# Patient Record
Sex: FEMALE | Race: WHITE | NOT HISPANIC OR LATINO | Employment: UNEMPLOYED | URBAN - METROPOLITAN AREA
[De-identification: names, ages, dates, MRNs, and addresses within clinical notes are randomized per-mention and may not be internally consistent; named-entity substitution may affect disease eponyms.]

---

## 2023-10-26 ENCOUNTER — TELEPHONE (OUTPATIENT)
Dept: OBSTETRICS AND GYNECOLOGY | Facility: CLINIC | Age: 68
End: 2023-10-26
Payer: MEDICARE

## 2023-10-26 DIAGNOSIS — Z79.890 HORMONE REPLACEMENT THERAPY: ICD-10-CM

## 2023-10-26 RX ORDER — ESTRADIOL 0.5 MG/1
0.5 TABLET ORAL DAILY
Qty: 90 TABLET | Refills: 0 | Status: SHIPPED | OUTPATIENT
Start: 2023-10-26 | End: 2024-03-19 | Stop reason: SDUPTHER

## 2023-10-26 RX ORDER — MEDROXYPROGESTERONE ACETATE 2.5 MG/1
2.5 TABLET ORAL DAILY
Qty: 90 TABLET | Refills: 0 | Status: SHIPPED | OUTPATIENT
Start: 2023-10-26 | End: 2024-02-05 | Stop reason: SDUPTHER

## 2023-10-26 RX ORDER — MEDROXYPROGESTERONE ACETATE 2.5 MG/1
1 TABLET ORAL DAILY
COMMUNITY
Start: 2021-04-27 | End: 2023-10-26 | Stop reason: SDUPTHER

## 2023-10-26 RX ORDER — ESTRADIOL 0.5 MG/1
1 TABLET ORAL DAILY
COMMUNITY
Start: 2021-04-27 | End: 2023-10-26 | Stop reason: SDUPTHER

## 2023-10-26 NOTE — TELEPHONE ENCOUNTER
[birth year]                        PU    Last RA 2022 (Medicare pt)    Patient has no upcoming appointments scheduled.    Received fax from [facility] (mail order) req refill on     Medroxypr AC  Tab  2.5 mg  #90            and      Estradiol -- but this request was on a separate request with NO DOSAGE  Or STRENGTH Listed

## 2024-02-05 ENCOUNTER — TELEPHONE (OUTPATIENT)
Dept: OBSTETRICS AND GYNECOLOGY | Facility: CLINIC | Age: 69
End: 2024-02-05
Payer: MEDICARE

## 2024-02-05 DIAGNOSIS — Z79.890 HORMONE REPLACEMENT THERAPY: ICD-10-CM

## 2024-02-05 RX ORDER — MEDROXYPROGESTERONE ACETATE 2.5 MG/1
2.5 TABLET ORAL DAILY
Qty: 90 TABLET | Refills: 0 | Status: SHIPPED | OUTPATIENT
Start: 2024-02-05 | End: 2024-03-19 | Stop reason: CLARIF

## 2024-02-05 NOTE — TELEPHONE ENCOUNTER
PT CALLED AND LEFT VM ON RX LINE IN BB TO GET A RX FOR MEDROXYPROGESTERONE 2.5MG SENT TO [facility]. PU PT AND SHE HAS HER ANNUAL ON 2-15-24/MP

## 2024-02-13 NOTE — PROGRESS NOTES
Subjective   Patient ID: Margot Pineda is a 68 y.o. female who presents for No chief complaint on file..    Pap: 12/12/2017 NEG HPV NEG  Mammo: 5/20/2021 NEG  Dexa: 5/20/2021   Colonoscopy: 4/24/2015, 10 years.     No VB. Had to change HRT because of insurance. Was off for a while and had hot flashes. Patient needed a refill on Estradiol tablet and the patch was ordered by mistake. DIdn't like the patch. Had a bad reaction. Still has some hot flashes. Taking 1/2 of Estradiol tablet. Tolerating hot flashes.     Up to date with cholesterol, etc.     Takes vit D and calcium. Mother may have had osteoporosis.     degenerative disc disease in neck. Has steroids, Baclofen. Doing exercises. Just had an MRI. Degenerative disc disease.   with colon cancer. Mets to LNs.       Review of Systems    Objective       Assessment/Plan

## 2024-02-15 ENCOUNTER — APPOINTMENT (OUTPATIENT)
Dept: OBSTETRICS AND GYNECOLOGY | Facility: CLINIC | Age: 69
End: 2024-02-15
Payer: MEDICARE

## 2024-03-18 NOTE — PROGRESS NOTES
"Subjective   Patient ID: Margot Pineda \"Apurva" is a 68 y.o. female who presents for hormone replacement.    Pap: 7/25/22 NEG NEG; 12/12/2017 NEG HPV NEG  Mammo: 8/12/22 NEG  Dexa: 5/20/2021   Colonoscopy: 4/24/2015, 10 years.    Thinks she has had a prolapse for years, sometimes has to push back in. Urgency/frequency. Gets some bowel leakage.     No VB. Had to change HRT because of insurance. Was off for a while and had hot flashes.  DIdn't like the patch. Had a bad reaction. Still has some hot flashes. Taking 1/2 of Estradiol tablet. Tolerating hot flashes.     Up to date with cholesterol, etc.     Takes vit D and calcium. Mother may have had osteoporosis.     degenerative disc disease in neck. Has steroids, Baclofen. Doing exercises. Just had an MRI. Degenerative disc disease.    * passed 4 months ago. Retired. Step daughter is in the area. She has a lot to do at the house.      Review of Systems   Eyes:  Positive for visual disturbance.   Genitourinary:  Positive for frequency and urgency.   Musculoskeletal:  Positive for back pain.   Neurological:  Positive for numbness.   Psychiatric/Behavioral:  Positive for sleep disturbance.         Anxiety  Depression  Hot flashes.   All other systems reviewed and are negative.      Objective   Constitutional: Alert and in no acute distress. Well developed, well nourished.  Neck: no neck asymmetry. Supple and thyroid not enlarged and there were no palpable thyroid nodules.  Chest: Breasts normal appearance, no nipple discharge and no skin changes and palpation of breasts and axillae: no palpable mass and no axillary lymphadenopathy.  Abdomen: soft nontender; no abdominal mass palpated, no organomegaly and no hernias.  Genitourinary: external genitalia: normal, no inguinal lymphadenopathy, Bartholin's urethral and [place]'s glands: normal, urethra: normal and bladder: normal on palpation.  Vagina: normal. Prolapse not appreciated today.  Cervix: normal.  Uterus: " normal.   Right adnexa/parametria: normal.  Left adnexa/parametria: normal.  Skin: normal skin color and pigmentation, normal skin turgor and no rash.  Psychiatric: alert and oriented x 3, affect normal to patient baseline and mood appropriate.     Assessment/Plan   -[facility]  -DEXA  -Will refill HRT for now, consider coming off next year. Try Progesterone 100 mg. Discussed risks with increasing age but as patient just lost her  would not change now. She is taking 1/2 tab of Estradiol.  -Refer to Dr. Arango for prolapse.

## 2024-03-19 ENCOUNTER — OFFICE VISIT (OUTPATIENT)
Dept: OBSTETRICS AND GYNECOLOGY | Facility: CLINIC | Age: 69
End: 2024-03-19
Payer: MEDICARE

## 2024-03-19 VITALS — BODY MASS INDEX: 19.84 KG/M2 | WEIGHT: 122 LBS | DIASTOLIC BLOOD PRESSURE: 82 MMHG | SYSTOLIC BLOOD PRESSURE: 140 MMHG

## 2024-03-19 DIAGNOSIS — Z13.820 SCREENING FOR OSTEOPOROSIS: ICD-10-CM

## 2024-03-19 DIAGNOSIS — Z79.890 HORMONE REPLACEMENT THERAPY: ICD-10-CM

## 2024-03-19 DIAGNOSIS — Z78.0 ASYMPTOMATIC POSTMENOPAUSAL STATE: ICD-10-CM

## 2024-03-19 DIAGNOSIS — Z12.31 ENCOUNTER FOR SCREENING MAMMOGRAM FOR BREAST CANCER: Primary | ICD-10-CM

## 2024-03-19 PROCEDURE — 1159F MED LIST DOCD IN RCRD: CPT | Performed by: OBSTETRICS & GYNECOLOGY

## 2024-03-19 PROCEDURE — 99213 OFFICE O/P EST LOW 20 MIN: CPT | Performed by: OBSTETRICS & GYNECOLOGY

## 2024-03-19 PROCEDURE — 1160F RVW MEDS BY RX/DR IN RCRD: CPT | Performed by: OBSTETRICS & GYNECOLOGY

## 2024-03-19 PROCEDURE — 1036F TOBACCO NON-USER: CPT | Performed by: OBSTETRICS & GYNECOLOGY

## 2024-03-19 RX ORDER — ESTRADIOL 0.5 MG/1
0.5 TABLET ORAL DAILY
Qty: 90 TABLET | Refills: 2 | Status: SHIPPED | OUTPATIENT
Start: 2024-03-19

## 2024-03-19 RX ORDER — PROGESTERONE 100 MG/1
100 CAPSULE ORAL NIGHTLY
Qty: 90 CAPSULE | Refills: 3 | Status: SHIPPED | OUTPATIENT
Start: 2024-03-19 | End: 2025-03-19

## 2024-03-19 ASSESSMENT — ENCOUNTER SYMPTOMS
BACK PAIN: 1
SLEEP DISTURBANCE: 1
NUMBNESS: 1
FREQUENCY: 1

## 2024-03-27 ENCOUNTER — HOSPITAL ENCOUNTER (OUTPATIENT)
Dept: RADIOLOGY | Facility: HOSPITAL | Age: 69
Discharge: HOME | End: 2024-03-27
Payer: MEDICARE

## 2024-03-27 VITALS — WEIGHT: 122 LBS | BODY MASS INDEX: 19.61 KG/M2 | HEIGHT: 66 IN

## 2024-03-27 DIAGNOSIS — Z12.31 ENCOUNTER FOR SCREENING MAMMOGRAM FOR BREAST CANCER: ICD-10-CM

## 2024-03-27 DIAGNOSIS — Z78.0 ASYMPTOMATIC POSTMENOPAUSAL STATE: ICD-10-CM

## 2024-03-27 DIAGNOSIS — Z13.820 SCREENING FOR OSTEOPOROSIS: ICD-10-CM

## 2024-03-27 PROCEDURE — 77080 DXA BONE DENSITY AXIAL: CPT

## 2024-03-27 PROCEDURE — 77067 SCR MAMMO BI INCL CAD: CPT | Performed by: RADIOLOGY

## 2024-03-27 PROCEDURE — 77067 SCR MAMMO BI INCL CAD: CPT

## 2024-03-27 PROCEDURE — 77063 BREAST TOMOSYNTHESIS BI: CPT | Performed by: RADIOLOGY

## 2024-03-27 PROCEDURE — 77080 DXA BONE DENSITY AXIAL: CPT | Performed by: STUDENT IN AN ORGANIZED HEALTH CARE EDUCATION/TRAINING PROGRAM

## 2024-04-16 ENCOUNTER — APPOINTMENT (OUTPATIENT)
Dept: OBSTETRICS AND GYNECOLOGY | Facility: CLINIC | Age: 69
End: 2024-04-16
Payer: MEDICARE

## 2024-10-20 ASSESSMENT — PROMIS GLOBAL HEALTH SCALE
RATE_PHYSICAL_HEALTH: VERY GOOD
EMOTIONAL_PROBLEMS: OFTEN
RATE_GENERAL_HEALTH: GOOD
RATE_QUALITY_OF_LIFE: VERY GOOD
CARRYOUT_SOCIAL_ACTIVITIES: GOOD
RATE_AVERAGE_PAIN: 4
RATE_MENTAL_HEALTH: GOOD
RATE_AVERAGE_FATIGUE: MILD
RATE_SOCIAL_SATISFACTION: FAIR
CARRYOUT_PHYSICAL_ACTIVITIES: COMPLETELY

## 2024-10-23 ENCOUNTER — APPOINTMENT (OUTPATIENT)
Dept: PRIMARY CARE | Facility: CLINIC | Age: 69
End: 2024-10-23
Payer: MEDICARE

## 2024-10-23 VITALS
OXYGEN SATURATION: 95 % | WEIGHT: 126 LBS | SYSTOLIC BLOOD PRESSURE: 119 MMHG | DIASTOLIC BLOOD PRESSURE: 84 MMHG | TEMPERATURE: 98.3 F | BODY MASS INDEX: 20.25 KG/M2 | HEIGHT: 66 IN | HEART RATE: 81 BPM

## 2024-10-23 DIAGNOSIS — K62.3 RECTAL PROLAPSE: ICD-10-CM

## 2024-10-23 DIAGNOSIS — Z00.00 ROUTINE GENERAL MEDICAL EXAMINATION AT HEALTH CARE FACILITY: Primary | ICD-10-CM

## 2024-10-23 DIAGNOSIS — E87.1 HYPONATREMIA: ICD-10-CM

## 2024-10-23 DIAGNOSIS — F41.9 ANXIETY: ICD-10-CM

## 2024-10-23 DIAGNOSIS — Z79.890 HORMONE REPLACEMENT THERAPY: ICD-10-CM

## 2024-10-23 DIAGNOSIS — R20.2 PARESTHESIA: ICD-10-CM

## 2024-10-23 DIAGNOSIS — Z13.220 LIPID SCREENING: ICD-10-CM

## 2024-10-23 DIAGNOSIS — E78.00 ELEVATED CHOLESTEROL: ICD-10-CM

## 2024-10-23 PROCEDURE — 1124F ACP DISCUSS-NO DSCNMKR DOCD: CPT | Performed by: FAMILY MEDICINE

## 2024-10-23 PROCEDURE — 1159F MED LIST DOCD IN RCRD: CPT | Performed by: FAMILY MEDICINE

## 2024-10-23 PROCEDURE — 1036F TOBACCO NON-USER: CPT | Performed by: FAMILY MEDICINE

## 2024-10-23 PROCEDURE — 1160F RVW MEDS BY RX/DR IN RCRD: CPT | Performed by: FAMILY MEDICINE

## 2024-10-23 PROCEDURE — 99213 OFFICE O/P EST LOW 20 MIN: CPT | Performed by: FAMILY MEDICINE

## 2024-10-23 PROCEDURE — 1170F FXNL STATUS ASSESSED: CPT | Performed by: FAMILY MEDICINE

## 2024-10-23 PROCEDURE — 3008F BODY MASS INDEX DOCD: CPT | Performed by: FAMILY MEDICINE

## 2024-10-23 PROCEDURE — G0439 PPPS, SUBSEQ VISIT: HCPCS | Performed by: FAMILY MEDICINE

## 2024-10-23 RX ORDER — BUSPIRONE HYDROCHLORIDE 7.5 MG/1
7.5 TABLET ORAL 2 TIMES DAILY PRN
Qty: 60 TABLET | Refills: 2 | Status: SHIPPED | OUTPATIENT
Start: 2024-10-23 | End: 2025-10-23

## 2024-10-23 ASSESSMENT — ACTIVITIES OF DAILY LIVING (ADL)
DRESSING: INDEPENDENT
TAKING_MEDICATION: INDEPENDENT
GROCERY_SHOPPING: INDEPENDENT
DOING_HOUSEWORK: INDEPENDENT
BATHING: INDEPENDENT
MANAGING_FINANCES: INDEPENDENT

## 2024-10-23 ASSESSMENT — PATIENT HEALTH QUESTIONNAIRE - PHQ9
10. IF YOU CHECKED OFF ANY PROBLEMS, HOW DIFFICULT HAVE THESE PROBLEMS MADE IT FOR YOU TO DO YOUR WORK, TAKE CARE OF THINGS AT HOME, OR GET ALONG WITH OTHER PEOPLE: NOT DIFFICULT AT ALL
SUM OF ALL RESPONSES TO PHQ9 QUESTIONS 1 AND 2: 0
1. LITTLE INTEREST OR PLEASURE IN DOING THINGS: SEVERAL DAYS
SUM OF ALL RESPONSES TO PHQ9 QUESTIONS 1 AND 2: 2
2. FEELING DOWN, DEPRESSED OR HOPELESS: SEVERAL DAYS
2. FEELING DOWN, DEPRESSED OR HOPELESS: NOT AT ALL
1. LITTLE INTEREST OR PLEASURE IN DOING THINGS: NOT AT ALL

## 2024-10-23 ASSESSMENT — ENCOUNTER SYMPTOMS
SHORTNESS OF BREATH: 0
CONSTIPATION: 0
FATIGUE: 0

## 2024-10-23 NOTE — PATIENT INSTRUCTIONS
Get your blood work as ordered.  You should hear from our office with results whether they are normal are not within a few days.  Please call the office if you do not hear from us.     You should be getting cardiovascular exercise 3-5 times per week for 30-45 minutes.  This includes exercises such as running, brisk walking, biking or swimming.         Referral to colorectal surgery     Anxiety medication as needed    Suspect some SIADH will check blood work and urine    Recommend dermatology evaluation  [facility] in [place]  [phone] or   [facility], [place] ,  several offices  [phone]

## 2024-10-23 NOTE — ASSESSMENT & PLAN NOTE
Orders:    CBC and Auto Differential; Future    Comprehensive Metabolic Panel; Future    Thyroid Stimulating Hormone; Future    Lipid Panel; Future    Vitamin B12; Future    Osmolality; Future    Sodium, urine, random; Future    Osmolality, urine; Future

## 2024-10-23 NOTE — PROGRESS NOTES
"Subjective   Reason for Visit: Margot Pineda is an 69 y.o. female here for a Medicare Wellness visit.     Past Medical, Surgical, and Family History reviewed and updated in chart.    Reviewed all medications by prescribing practitioner or clinical pharmacist (such as prescriptions, OTCs, herbal therapies and supplements) and documented in the medical record.        Walks routinely   Exercising       Stool urgency at times     DJD in neck   Occ numbness  in hands   Mostly at night   Not bad during day       Occasionally gets a little upset related to anxiety thinking about her 's passing it has been a year        Patient Care Team:  Annabelle Ovalle MD as PCP - General (Family Medicine)  Maya Quiros [facility]     Review of Systems   Constitutional:  Negative for fatigue.   Respiratory:  Negative for shortness of breath.    Cardiovascular:  Negative for chest pain.   Gastrointestinal:  Negative for constipation.       Objective   Vitals:  /84   Pulse 81   Temp 36.8 °C (98.3 °F)   Ht 1.676 m (5' 6\")   Wt 57.2 kg (126 lb)   SpO2 95%   BMI 20.34 kg/m²       Physical Exam  Constitutional:       General: She is not in acute distress.     Appearance: Normal appearance.   HENT:      Head: Normocephalic and atraumatic.      Right Ear: Tympanic membrane, ear canal and external ear normal.      Left Ear: Tympanic membrane, ear canal and external ear normal.      Nose: Nose normal.      Mouth/Throat:      Mouth: Mucous membranes are moist.      Pharynx: No oropharyngeal exudate or posterior oropharyngeal erythema.   Eyes:      Extraocular Movements: Extraocular movements intact.      Conjunctiva/sclera: Conjunctivae normal.      Pupils: Pupils are equal, round, and reactive to light.   Cardiovascular:      Rate and Rhythm: Normal rate and regular rhythm.      Heart sounds: No murmur heard.  Pulmonary:      Effort: Pulmonary effort is normal.      Breath sounds: Normal " breath sounds.   Abdominal:      General: Bowel sounds are normal.      Palpations: Abdomen is soft.   Musculoskeletal:         General: Normal range of motion.      Cervical back: No rigidity.   Lymphadenopathy:      Cervical: No cervical adenopathy.   Skin:     General: Skin is warm and dry.      Findings: No rash.   Neurological:      General: No focal deficit present.      Mental Status: She is alert and oriented to person, place, and time.      Cranial Nerves: No cranial nerve deficit.      Gait: Gait normal.   Psychiatric:         Mood and Affect: Mood normal.         Behavior: Behavior normal.         Assessment & Plan  Hormone replacement therapy    Orders:    CBC and Auto Differential; Future    Comprehensive Metabolic Panel; Future    Thyroid Stimulating Hormone; Future    Lipid Panel; Future    Vitamin B12; Future    Osmolality; Future    Sodium, urine, random; Future    Osmolality, urine; Future    Routine general medical examination at health care facility    Orders:    1 Year Follow Up In Primary Care - Wellness Exam; Future    CBC and Auto Differential; Future    Comprehensive Metabolic Panel; Future    Thyroid Stimulating Hormone; Future    Lipid Panel; Future    Vitamin B12; Future    Osmolality; Future    Sodium, urine, random; Future    Osmolality, urine; Future    Rectal prolapse    Orders:    Referral to Colorectal Surgery; Future    CBC and Auto Differential; Future    Comprehensive Metabolic Panel; Future    Thyroid Stimulating Hormone; Future    Lipid Panel; Future    Vitamin B12; Future    Osmolality; Future    Sodium, urine, random; Future    Osmolality, urine; Future    Lipid screening    Orders:    CBC and Auto Differential; Future    Comprehensive Metabolic Panel; Future    Thyroid Stimulating Hormone; Future    Lipid Panel; Future    Vitamin B12; Future    Osmolality; Future    Sodium, urine, random; Future    Osmolality, urine; Future    Paresthesia    Orders:    CBC and Auto  Differential; Future    Comprehensive Metabolic Panel; Future    Thyroid Stimulating Hormone; Future    Lipid Panel; Future    Vitamin B12; Future    Osmolality; Future    Sodium, urine, random; Future    Osmolality, urine; Future    Elevated cholesterol    Orders:    CBC and Auto Differential; Future    Comprehensive Metabolic Panel; Future    Thyroid Stimulating Hormone; Future    Lipid Panel; Future    Vitamin B12; Future    Osmolality; Future    Sodium, urine, random; Future    Osmolality, urine; Future    Hyponatremia    Orders:    CBC and Auto Differential; Future    Comprehensive Metabolic Panel; Future    Thyroid Stimulating Hormone; Future    Lipid Panel; Future    Vitamin B12; Future    Osmolality; Future    Sodium, urine, random; Future    Osmolality, urine; Future    Anxiety    Orders:    busPIRone (Buspar) 7.5 mg tablet; Take 1 tablet (7.5 mg) by mouth 2 times a day as needed (anxiety).

## 2024-10-23 NOTE — ASSESSMENT & PLAN NOTE
Orders:    1 Year Follow Up In Primary Care - Wellness Exam; Future    CBC and Auto Differential; Future    Comprehensive Metabolic Panel; Future    Thyroid Stimulating Hormone; Future    Lipid Panel; Future    Vitamin B12; Future    Osmolality; Future    Sodium, urine, random; Future    Osmolality, urine; Future

## 2024-10-24 ENCOUNTER — LAB (OUTPATIENT)
Dept: LAB | Facility: LAB | Age: 69
End: 2024-10-24
Payer: MEDICARE

## 2024-10-24 DIAGNOSIS — E87.1 HYPONATREMIA: ICD-10-CM

## 2024-10-24 DIAGNOSIS — Z79.890 HORMONE REPLACEMENT THERAPY: ICD-10-CM

## 2024-10-24 DIAGNOSIS — E78.00 ELEVATED CHOLESTEROL: ICD-10-CM

## 2024-10-24 DIAGNOSIS — R20.2 PARESTHESIA: ICD-10-CM

## 2024-10-24 DIAGNOSIS — Z00.00 ROUTINE GENERAL MEDICAL EXAMINATION AT HEALTH CARE FACILITY: ICD-10-CM

## 2024-10-24 DIAGNOSIS — K62.3 RECTAL PROLAPSE: ICD-10-CM

## 2024-10-24 DIAGNOSIS — Z13.220 LIPID SCREENING: ICD-10-CM

## 2024-10-24 LAB
ALBUMIN SERPL BCP-MCNC: 4.4 G/DL (ref 3.4–5)
ALP SERPL-CCNC: 74 U/L (ref 33–136)
ALT SERPL W P-5'-P-CCNC: 23 U/L (ref 7–45)
ANION GAP SERPL CALC-SCNC: 13 MMOL/L (ref 10–20)
AST SERPL W P-5'-P-CCNC: 30 U/L (ref 9–39)
BASOPHILS # BLD AUTO: 0.04 X10*3/UL (ref 0–0.1)
BASOPHILS NFR BLD AUTO: 0.9 %
BILIRUB SERPL-MCNC: 0.6 MG/DL (ref 0–1.2)
BUN SERPL-MCNC: 15 MG/DL (ref 6–23)
CALCIUM SERPL-MCNC: 9.9 MG/DL (ref 8.6–10.3)
CHLORIDE SERPL-SCNC: 95 MMOL/L (ref 98–107)
CHOLEST SERPL-MCNC: 209 MG/DL (ref 0–199)
CHOLESTEROL/HDL RATIO: 2.6
CO2 SERPL-SCNC: 27 MMOL/L (ref 21–32)
CREAT SERPL-MCNC: 0.79 MG/DL (ref 0.5–1.05)
EGFRCR SERPLBLD CKD-EPI 2021: 82 ML/MIN/1.73M*2
EOSINOPHIL # BLD AUTO: 0.06 X10*3/UL (ref 0–0.7)
EOSINOPHIL NFR BLD AUTO: 1.4 %
ERYTHROCYTE [DISTWIDTH] IN BLOOD BY AUTOMATED COUNT: 13.1 % (ref 11.5–14.5)
GLUCOSE SERPL-MCNC: 96 MG/DL (ref 74–99)
HCT VFR BLD AUTO: 38.4 % (ref 36–46)
HDLC SERPL-MCNC: 80 MG/DL
HGB BLD-MCNC: 13.4 G/DL (ref 12–16)
IMM GRANULOCYTES # BLD AUTO: 0.02 X10*3/UL (ref 0–0.7)
IMM GRANULOCYTES NFR BLD AUTO: 0.5 % (ref 0–0.9)
LDLC SERPL CALC-MCNC: 120 MG/DL
LYMPHOCYTES # BLD AUTO: 1.29 X10*3/UL (ref 1.2–4.8)
LYMPHOCYTES NFR BLD AUTO: 29.3 %
MCH RBC QN AUTO: 31.1 PG (ref 26–34)
MCHC RBC AUTO-ENTMCNC: 34.9 G/DL (ref 32–36)
MCV RBC AUTO: 89 FL (ref 80–100)
MONOCYTES # BLD AUTO: 0.55 X10*3/UL (ref 0.1–1)
MONOCYTES NFR BLD AUTO: 12.5 %
NEUTROPHILS # BLD AUTO: 2.45 X10*3/UL (ref 1.2–7.7)
NEUTROPHILS NFR BLD AUTO: 55.4 %
NON HDL CHOLESTEROL: 129 MG/DL (ref 0–149)
NRBC BLD-RTO: 0 /100 WBCS (ref 0–0)
PLATELET # BLD AUTO: 357 X10*3/UL (ref 150–450)
POTASSIUM SERPL-SCNC: 4.1 MMOL/L (ref 3.5–5.3)
PROT SERPL-MCNC: 6.7 G/DL (ref 6.4–8.2)
RBC # BLD AUTO: 4.31 X10*6/UL (ref 4–5.2)
SODIUM SERPL-SCNC: 131 MMOL/L (ref 136–145)
TRIGL SERPL-MCNC: 46 MG/DL (ref 0–149)
TSH SERPL-ACNC: 1.94 MIU/L (ref 0.44–3.98)
VLDL: 9 MG/DL (ref 0–40)
WBC # BLD AUTO: 4.4 X10*3/UL (ref 4.4–11.3)

## 2024-10-24 PROCEDURE — 85025 COMPLETE CBC W/AUTO DIFF WBC: CPT

## 2024-10-24 PROCEDURE — 80061 LIPID PANEL: CPT

## 2024-10-24 PROCEDURE — 83935 ASSAY OF URINE OSMOLALITY: CPT

## 2024-10-24 PROCEDURE — 83930 ASSAY OF BLOOD OSMOLALITY: CPT

## 2024-10-24 PROCEDURE — 82570 ASSAY OF URINE CREATININE: CPT

## 2024-10-24 PROCEDURE — 84443 ASSAY THYROID STIM HORMONE: CPT

## 2024-10-24 PROCEDURE — 80053 COMPREHEN METABOLIC PANEL: CPT

## 2024-10-24 PROCEDURE — 82607 VITAMIN B-12: CPT

## 2024-10-24 PROCEDURE — 84300 ASSAY OF URINE SODIUM: CPT

## 2024-10-24 PROCEDURE — 36415 COLL VENOUS BLD VENIPUNCTURE: CPT

## 2024-10-25 LAB
CREAT UR-MCNC: 23.8 MG/DL (ref 20–320)
OSMOLALITY SERPL: 278 MOSM/KG (ref 280–300)
OSMOLALITY UR: 218 MOSM/KG (ref 200–1200)
SODIUM UR-SCNC: 34 MMOL/L
SODIUM/CREAT UR-RTO: 143 MMOL/G CREAT
VIT B12 SERPL-MCNC: 997 PG/ML (ref 211–911)

## 2024-10-27 NOTE — RESULT ENCOUNTER NOTE
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

## 2024-11-01 ENCOUNTER — OFFICE VISIT (OUTPATIENT)
Dept: SURGERY | Facility: CLINIC | Age: 69
End: 2024-11-01
Payer: MEDICARE

## 2024-11-01 VITALS
HEIGHT: 67 IN | WEIGHT: 126 LBS | BODY MASS INDEX: 19.78 KG/M2 | DIASTOLIC BLOOD PRESSURE: 88 MMHG | HEART RATE: 75 BPM | SYSTOLIC BLOOD PRESSURE: 136 MMHG | OXYGEN SATURATION: 98 %

## 2024-11-01 DIAGNOSIS — K62.3 RECTAL PROLAPSE: ICD-10-CM

## 2024-11-01 DIAGNOSIS — K62.3 RECTAL PROLAPSE: Primary | ICD-10-CM

## 2024-11-01 PROCEDURE — 3008F BODY MASS INDEX DOCD: CPT | Performed by: SURGERY

## 2024-11-01 PROCEDURE — 99214 OFFICE O/P EST MOD 30 MIN: CPT | Performed by: SURGERY

## 2024-11-11 ENCOUNTER — APPOINTMENT (OUTPATIENT)
Dept: PHYSICAL THERAPY | Facility: CLINIC | Age: 69
End: 2024-11-11
Payer: MEDICARE

## 2024-11-19 ENCOUNTER — APPOINTMENT (OUTPATIENT)
Dept: PHYSICAL THERAPY | Facility: CLINIC | Age: 69
End: 2024-11-19
Payer: MEDICARE

## 2024-11-21 ENCOUNTER — EVALUATION (OUTPATIENT)
Dept: PHYSICAL THERAPY | Facility: CLINIC | Age: 69
End: 2024-11-21
Payer: MEDICARE

## 2024-11-21 DIAGNOSIS — K62.3 RECTAL PROLAPSE: ICD-10-CM

## 2024-11-21 PROCEDURE — 97112 NEUROMUSCULAR REEDUCATION: CPT | Mod: GP | Performed by: PHYSICAL THERAPIST

## 2024-11-21 PROCEDURE — 97530 THERAPEUTIC ACTIVITIES: CPT | Mod: GP | Performed by: PHYSICAL THERAPIST

## 2024-11-21 PROCEDURE — 97162 PT EVAL MOD COMPLEX 30 MIN: CPT | Mod: GP | Performed by: PHYSICAL THERAPIST

## 2024-11-21 NOTE — PROGRESS NOTES
"           Physical Therapy Pelvic Floor Evaluation    Patient Name: Margot Pineda \"Estela\"  MRN: [id number]  Evaluation Date: 2024  Time Calculation  Start Time: 1605  Stop Time: 1700  Time Calculation (min): 55 min  PT Evaluation Time Entry  PT Evaluation (Moderate) Time Entry: 30     PT Therapeutic Procedures Time Entry  Neuromuscular Re-Education Time Entry: 15  Therapeutic Activity Time Entry: 10       Problem List Items Addressed This Visit             ICD-10-CM    Rectal prolapse K62.3        Subjective    Precautions:     Rectal prolapse    Pain:     No pain     PELVIC HISTORY:  Chief Complaint/Description of Symptoms:   Long time problem, just put off.  Didn't tell  or any doctors till now.  Patient reports her rectum \"falls out\" and she has to manually push int back in.  Also reports fecal incontinence.  Usually can feel the incontinence after it happens.  Has a history of eating disorder even from adolescence.  Waiting for consult with   Past Medical History:    Arthritis,   Home Environment/Social Factors/Occupation:   Retired,  passed away last year and is still dealing with grief  Patient Primary Goal:   To get stronger to assist in limiting prolapse    PELVIC PAIN:     No pain or pressure    MENSTRUAL HISTORY:  Post menopausal     BLADDER:     Daytime Voiding Frequency: at least 16 times  Nighttime Voiding Frequency: 4 times a night  Unintentional urine loss:  NO    BOWEL:     BM Frequency: a few times a day  Frequent constipation/straining/incomplete emptying: feels incomplete  [place] Stool Scale ratin,2   FI occurs with what activity: walking, on feet lifting,   can tell just before going to have incontinence;    FLUID/DIET INTAKE:  Water:  16.9 ounces -- 8 bottles (was told by dr that may be drinking too much water as sodium levels are too low)   Diet:  fruits, veggies, whole grains, yogurt  Yogurt with berries, banana, mutligrain sourdough, cereal, salad with veggies and " cheese and protein;      EXERCISE:  Current exercise regime:   Walking, sit ups, yoga;      Objective   POSTURE/ALIGNMENT:  Slender, scoliosis, + stork       ROM:  Lumbar ROM Range   Flexion full   Extension 75%      MMT:  Hip MMT L R   Hip Flexion 4-/5 4-/5   Hip Extension 4/5 4/5   Hip Abduction 4/5 4/5      TA: good TA activation, poor active straight leg raise, poor diaphragmatic breathing unless cued.      FLEXIBILITY R/L:  Hamstrings: tight/tight  Piriformis:  tight/tight    PELVIC FLOOR  Deferred till next session;      Outcome Measures:  NIH-CPSI: 18  Pain: 0  Urinary: 8  Quality of Life Impact: 10     Treatments:  Therapeutic activity:      Discussed avoiding sit ups  Bowel elimination positions,   urge suppression techniques  Neuro Re-education:    Breathing pattern;      OP EDUCATION:  Outpatient Education  Individual(s) Educated: Patient  Education Provided: Anatomy, Physiology, POC, Home Exercise Program  Risk and Benefits Discussed with Patient/Caregiver/Other: yes  Patient/Caregiver Demonstrated Understanding: yes  Plan of Care Discussed and Agreed Upon: yes  Patient Response to Education: Patient/Caregiver Verbalized Understanding of Information, Patient/Caregiver Performed Return Demonstration of Exercises/Activities, Patient/Caregiver Asked Appropriate Questions    Assessment     PT Assessment Results: Decreased strength, Decreased range of motion, Decreased mobility, Decreased coordination, Pain  Rehab Prognosis: Good  Evaluation/Treatment Tolerance: Patient tolerated treatment well    Pt is a 69 y.o. female who presents with impairments of weakness, impaired breathing coordination. These impairments have led to functional limitations including fecal incontinence and urinary urgency. Pt would benefit from skilled physical therapy intervention to improve above impairments and facilitate return to function.     Complexity of Evaluation: Moderate    Based on the history including personal factors  and/or comorbidities, examination of body systems including body structures and function, activity limitations, and/or participation restrictions, as well as clinical presentation, patient meets criteria for a Moderate complexity evaluation.    Plan:  Treatment/Interventions: Biofeedback, Dry needling, Education/ Instruction, Electrical stimulation, Manual therapy, Neuromuscular re-education, Therapeutic activities, Therapeutic exercises  PT Plan: Skilled PT  PT Frequency: 1 time per week  Duration: 10 sessions  Certification Period Start Date: 11/21/24  Certification Period End Date: 02/19/25  Number of Treatments Authorized: medical necessity  Rehab Potential: Good  Plan of Care Agreement: Patient    Insurance Plan: Payor: MEDICARE / Plan: MEDICARE PART A AND B / Product Type: *No Product type* /     Next session:  pelvic floor assessment    Goals:     Pelvic Floor     STGs  1)  Patient will be knowledgeable with regards to downtraining techniques to improve relaxation of pelvic floor muscles  2)  Patient will report 25% fecal incontinence during walking  3)  Patient will perform diaphragmatic breathing properly to relax and contract pelvic floor muscle appropriately  4)  Patient will demonstrate good transverse abdominis activation to stabilize lumbopelvic girdle during ADLs  5)  Patient will report successful use of urge suppression strategies at least 50% of the time      LTGs;    1)  Patient will report improved sleep with less waking to void (will only wake 1-2 times a night)   2)  Patient will report reduce the amount of times patient urinates during the day to 8  3)  Patient will report 50% less fecal incontinence   4)  Patient will improve pelvic floor contraction to by 1/2-1 MMT  with coordinated exhale for improved continence

## 2024-11-25 ENCOUNTER — TREATMENT (OUTPATIENT)
Dept: PHYSICAL THERAPY | Facility: CLINIC | Age: 69
End: 2024-11-25
Payer: MEDICARE

## 2024-11-25 DIAGNOSIS — K62.3 RECTAL PROLAPSE: ICD-10-CM

## 2024-11-25 PROCEDURE — 97110 THERAPEUTIC EXERCISES: CPT | Mod: GP | Performed by: PHYSICAL THERAPIST

## 2024-11-25 PROCEDURE — 97112 NEUROMUSCULAR REEDUCATION: CPT | Mod: GP | Performed by: PHYSICAL THERAPIST

## 2024-11-25 NOTE — PROGRESS NOTES
Physical Therapy Treatment    Patient Name: Margot Pineda  MRN: [id number]  Encounter date:  11/25/2024  Time Calculation  Start Time: 0900  Stop Time: 1000  Time Calculation (min): 60 min     PT Therapeutic Procedures Time Entry  Neuromuscular Re-Education Time Entry: 30  Therapeutic Exercise Time Entry: 25    Visit Number:  2 (including evaluation)  Planned total visits: 10 per POC  Visits Authorized/Insurance Coverage:  11/20/2024: [place] A/[place], [place], [place], [place], ($0 USED PT/ST)     Current Problem  Problem List Items Addressed This Visit             ICD-10-CM    Rectal prolapse K62.3     Precautions     No precautions    Pain     No pain     Subjective  General        Leans forward in standing to assist in reducing prolapse when unable to manually reduce.      Objective  + visible sign of prolapse -- EAS not closed.    0/5 EAS and IAS strength initially but able to achieve 1/5 with cues    Treatment:  Neuro Re-education:  With verbal consent to rectal examination and treatment  Rectal digital biofeedback for posterior pelvic floor contraction  Cues need for exhaling and contraction.    Also performed sidelying hip adduction with pillow with kegel and exhale.  (Increased muscle activation.  Therapeutic Exercise:    Bridging x 10   Hip adduction with ball/pillow x 10   Hip abduction in hooklying  with blue band x 10   Clamshell in sidelying:   10   Heel raises x 10   Discussed exhaling on effort and with transfers     Current HEP:  Access Code: [email or web address]  URL: [email or web address]  Date: 11/25/2024  Prepared by: Fidelina Arreola    Exercises  - Supine Bridge with Pelvic Floor Contraction and Hip Rotation  - 1 x daily - 7 x weekly - 1 sets - 10 reps  - Pelvic Floor Contractions in Hooklying with Adduction  - 1 x daily - 7 x weekly - 1 sets - 10 reps  - Pelvic Floor Contractions in Hooklying with Resisted Abduction  - 1 x daily - 7 x weekly - 1 sets - 10 reps  - Sidelying Clamshell with Pelvic Floor  Contraction  - 1 x daily - 7 x weekly - 1 sets - 10 reps  - Standing Heel Raise with Support  - 1 x daily - 7 x weekly - 1 sets - 10 reps  - Sit to Stand with Pelvic Floor Contraction  - 1 x daily - 7 x weekly - 1 sets - 1-2 reps    OP EDUCATION:     Updated HEP  Assessment:     Pt's response to treatment:  Patient with weakness of EAS and IAS.  Non tender to palpation of pelvic floor.  Patient seeing Colorectal surgeon in January.  Will continue to benefit from PT strengthen sphincters and pelvic floor to improve continence and reduce prolapse episodes.      Pain end of session: 0    Plan:     Continue with current POC with the following changes Biofeedback and stim as needed    Assessment of current progress against goals:  Insufficient treatment time to assess progress    Goals:     Pelvic Floor     STGs - 5 sessions  1)  Patient will be knowledgeable with regards to downtraining techniques to improve relaxation of pelvic floor muscles  2)  Patient will report 25% fecal incontinence during walking  3)  Patient will perform diaphragmatic breathing properly to relax and contract pelvic floor muscle appropriately  4)  Patient will demonstrate good transverse abdominis activation to stabilize lumbopelvic girdle during ADLs  5)  Patient will report successful use of urge suppression strategies at least 50% of the time      LTGs - 10 sessions  1)  Patient will report improved sleep with less waking to void (will only wake 1-2 times a night)   2)  Patient will report reduce the amount of times patient urinates during the day to 8  3)  Patient will report 50% less fecal incontinence   4)  Patient will improve pelvic floor contraction to by 1/2-1 MMT  with coordinated exhale for improved continence

## 2024-12-06 ENCOUNTER — TREATMENT (OUTPATIENT)
Dept: PHYSICAL THERAPY | Facility: CLINIC | Age: 69
End: 2024-12-06
Payer: MEDICARE

## 2024-12-06 DIAGNOSIS — K62.3 RECTAL PROLAPSE: ICD-10-CM

## 2024-12-06 PROCEDURE — 97112 NEUROMUSCULAR REEDUCATION: CPT | Mod: GP | Performed by: PHYSICAL THERAPIST

## 2024-12-06 PROCEDURE — 97110 THERAPEUTIC EXERCISES: CPT | Mod: GP | Performed by: PHYSICAL THERAPIST

## 2024-12-06 NOTE — PROGRESS NOTES
Physical Therapy Treatment    Patient Name: Margot Pineda  MRN: [id number]  Encounter date:  12/6/2024  Time Calculation  Start Time: 1110  Stop Time: 1210  Time Calculation (min): 60 min     PT Therapeutic Procedures Time Entry  Neuromuscular Re-Education Time Entry: 25  Therapeutic Exercise Time Entry: 30    Visit Number:  3 (including evaluation)  Planned total visits: 10 per POC  Visits Authorized/Insurance Coverage:  11/20/2024: [place] A/[place], [place], [place], ($0 USED PT/ST)     Current Problem  Problem List Items Addressed This Visit             ICD-10-CM    Rectal prolapse K62.3       Precautions     No precautions    Pain     No pain     Subjective  General        Not getting up as much to urinate.  Still with fecal incontinence and prolapse.      Objective  + visible sign of prolapse -- EAS not closed.    Resting tone 3 uV... contraction to 7-10 uV    Treatment:  Neuro Re-education:  With verbal consent to rectal examination and treatment  Used biofeedback with rectal sensor -- patient in left sidelying  Kegel, kegel with 10 sec hold, and kegel with hip adduction  Had one episode of pushing sensor out instead of zachary to draw inward.    Therapeutic Exercise:    Bridging x 10   Bridging with hip adduction with ball squeeze x 10   Hip adduction with ball/pillow x 10   Hip abduction in hooklying  with blue band x 10   Clamshell in sidelying:   10   Heel raises x 10   Side stepping with green band around ankles x 10' x 4  Sitting hip IR x 10 with green band and ball squeeze  Discussed exhaling on effort and with transfers     Current HEP:  Access Code: [email or web address]  URL: [email or web address]  Date: 11/25/2024  Prepared by: Fidelina Arreola    Exercises  - Supine Bridge with Pelvic Floor Contraction and Hip Rotation  - 1 x daily - 7 x weekly - 1 sets - 10 reps  - Pelvic Floor Contractions in Hooklying with Adduction  - 1 x daily - 7 x weekly - 1 sets - 10 reps  - Pelvic Floor Contractions in  Hooklying with Resisted Abduction  - 1 x daily - 7 x weekly - 1 sets - 10 reps  - Sidelying Clamshell with Pelvic Floor Contraction  - 1 x daily - 7 x weekly - 1 sets - 10 reps  - Standing Heel Raise with Support  - 1 x daily - 7 x weekly - 1 sets - 10 reps  - Sit to Stand with Pelvic Floor Contraction  - 1 x daily - 7 x weekly - 1 sets - 1-2 reps    OP EDUCATION:     Updated HEP  Assessment:     Pt's response to treatment:  Patient able to contract and visualize pelvic floor contraction during biofeedback.  Patient with updated HEP to improve pelvic floor contraction.  Continues with weakness and mismanagement of pressure.    Pain end of session: 0    Plan:     Continue with current POC with the following changes Biofeedback and stim as needed    Assessment of current progress against goals:  Insufficient treatment time to assess progress    Goals:     Pelvic Floor     STGs - 5 sessions  1)  Patient will be knowledgeable with regards to downtraining techniques to improve relaxation of pelvic floor muscles  2)  Patient will report 25% fecal incontinence during walking  3)  Patient will perform diaphragmatic breathing properly to relax and contract pelvic floor muscle appropriately  4)  Patient will demonstrate good transverse abdominis activation to stabilize lumbopelvic girdle during ADLs  5)  Patient will report successful use of urge suppression strategies at least 50% of the time      LTGs - 10 sessions  1)  Patient will report improved sleep with less waking to void (will only wake 1-2 times a night)   2)  Patient will report reduce the amount of times patient urinates during the day to 8  3)  Patient will report 50% less fecal incontinence   4)  Patient will improve pelvic floor contraction to by 1/2-1 MMT  with coordinated exhale for improved continence

## 2024-12-24 NOTE — PROGRESS NOTES
Margot Pineda is a 69 year old female referred by Veto Nguyễn MD for evaluation of rectal prolapse.    She reports some intermittent straining from intermittent constipation over the years. However, she reports that for the last 4 to 5 years that she has felt a soft tissue mass that would protrude from her anus. She reports that sometimes she will feel this even when walking. She denies any blood per her anus.  However, she has now progressed to the point where she will need to push the mass back inside, and she is avoiding walking because of it.     4/24/2015 Colonoscopy to cecum  The perianal and digital rectal examinations were normal.  A diminutive polyp was found in the rectum. The polyp was sessile. The  polyp was removed with a cold biopsy forceps. Resection and retrieval were complete.      Past Medical History  Osteoarthritis  Anxiety  Rectal prolapse    Surgical History  Breast biopsy  Oral surgery  Lipoma excised from shoulder    Social History  Smoking:   ETOH:    Family History      Review of Systems  Constitutional: Negative for fever, chills, anorexia, weight loss, malaise     ENMT: Negative for nasal discharge, congestion, ear pain, mouth pain, throat pain     Respiratory: Negative for cough, hemoptysis, wheezing, shortness of breath     Cardiac: Negative for chest pain, dyspnea on exertion, orthopnea, palpitations, syncope     Gastrointestinal: Negative for nausea, vomiting, diarrhea, constipation, abdominal pain, (+)RECTAL PROLAPSE    Genitourinary: Negative for discharge, dysuria, flank pain, frequency, hematuria     Musculoskeletal: Negative for decreased ROM, pain, swelling, weakness, (+)OSTEOARTHRITIS     Neurological: Negative for dizziness, confusion, headache, seizures, syncope     Psychiatric: Negative for mood changes, anxiety, hallucinations, sleep changes, suicidal ideas, (+)ANXIETY     Skin: Negative for mass, pain, itching, rash, ulcer     Endocrine: Negative for heat  intolerance, cold intolerance, excessive sweating, polyuria, excess thirst     Hematologic/Lymph: Negative for anemia, bruising, easy bleeding, night sweats, petechiae, history of DVT/PE or cancer     Allergic/Immunologic: Negative for anaphylaxis, itchy/ teary eyes, itching, sneezing, swelling    Physical Exam  Constitutional: Well developed, awake/alert/oriented x3, no distress, alert and cooperative             Eyes: Sclera anicteric, no conjunctival inflammation, conjugate gaze    ENMT: mucous membranes moist, no apparent injury,            Head/Neck: Neck supple, no apparent injury, No JVD, trachea midline, no bruits              Respiratory/Thorax: Patent airways, CTAB, normal breath sounds with good chest expansion, thorax symmetric         Cardiovascular: Regular, rate and rhythm, no murmurs, normal S1 and S2         Gastrointestinal: Nondistended, soft, non-tender, no rebound tenderness or guarding, no masses palpable, no organomegaly, +BS, no bruits               Extremities: normal extremities, no cyanosis edema, contusions or wounds, 2+ femoral pulses B/L              Neurological: alert and oriented x3, normal strength, Normal gait          Lymphatic: No palpable inguinal lymphadenopathy   Psychological: Appropriate mood and behavior         Skin: Warm and dry, no lesions, no rashes                Anorectal:      Impression:      Plan:

## 2024-12-30 PROBLEM — R53.81 MALAISE AND FATIGUE: Status: ACTIVE | Noted: 2024-12-30

## 2024-12-30 PROBLEM — M54.12 LEFT CERVICAL RADICULOPATHY: Status: ACTIVE | Noted: 2024-12-30

## 2024-12-30 PROBLEM — M62.81 MUSCLE WEAKNESS: Status: ACTIVE | Noted: 2024-12-30

## 2024-12-30 PROBLEM — F34.1 PRIMARY DYSTHYMIA: Status: ACTIVE | Noted: 2024-12-30

## 2024-12-30 PROBLEM — G47.00 INSOMNIA: Status: ACTIVE | Noted: 2024-12-30

## 2024-12-30 PROBLEM — Z86.19 HISTORY OF VARICELLA: Status: ACTIVE | Noted: 2024-12-30

## 2024-12-30 PROBLEM — Z13.1 SCREENING FOR DIABETES MELLITUS: Status: ACTIVE | Noted: 2024-12-30

## 2024-12-30 PROBLEM — E87.1 HYPONATREMIA: Status: ACTIVE | Noted: 2024-12-30

## 2024-12-30 PROBLEM — M54.2 NECK PAIN: Status: ACTIVE | Noted: 2024-12-30

## 2024-12-30 PROBLEM — G54.2 DISORDER OF LEFT CERVICAL NERVE ROOT: Status: ACTIVE | Noted: 2024-12-30

## 2024-12-30 PROBLEM — G95.20 CORD COMPRESSION MYELOPATHY (MULTI): Status: ACTIVE | Noted: 2024-12-30

## 2024-12-30 PROBLEM — Z79.890 HORMONE REPLACEMENT THERAPY (POSTMENOPAUSAL): Status: ACTIVE | Noted: 2024-12-30

## 2024-12-30 PROBLEM — R53.83 MALAISE AND FATIGUE: Status: ACTIVE | Noted: 2024-12-30

## 2024-12-30 PROBLEM — D17.20 LIPOMA OF SHOULDER: Status: ACTIVE | Noted: 2024-12-30

## 2024-12-30 PROBLEM — M50.90 CERVICAL DISC DISEASE: Status: ACTIVE | Noted: 2024-12-30

## 2024-12-30 RX ORDER — OMEGA-3-ACID ETHYL ESTERS 1 G/1
CAPSULE, LIQUID FILLED ORAL
COMMUNITY

## 2024-12-30 RX ORDER — BACLOFEN 10 MG/1
TABLET ORAL
COMMUNITY
Start: 2018-07-20

## 2024-12-30 RX ORDER — PREDNISONE 20 MG/1
TABLET ORAL
COMMUNITY
Start: 2021-09-09

## 2024-12-30 RX ORDER — ESCITALOPRAM OXALATE 5 MG/1
TABLET ORAL
COMMUNITY
Start: 2019-10-24

## 2025-01-03 ENCOUNTER — APPOINTMENT (OUTPATIENT)
Dept: PHYSICAL THERAPY | Facility: CLINIC | Age: 70
End: 2025-01-03
Payer: MEDICARE

## 2025-01-03 ENCOUNTER — DOCUMENTATION (OUTPATIENT)
Dept: PHYSICAL THERAPY | Facility: CLINIC | Age: 70
End: 2025-01-03
Payer: MEDICARE

## 2025-01-03 NOTE — PROGRESS NOTES
"Physical Therapy                 Therapy Communication Note    Patient Name: Margot Pineda \"Estela\"  MRN: [id number]  Today's Date: 1/3/2025     Discipline: Physical Therapy      Missed Visit Reason:  cancel because of weather    Missed Time: Cancel    "

## 2025-01-03 NOTE — PROGRESS NOTES
Physical Therapy Treatment    Patient Name: Margot Pineda  MRN: [id number]  Encounter date:  1/3/2025             Visit Number:  4 (including evaluation)  Planned total visits: 10 per POC  Visits Authorized/Insurance Coverage:  11/20/2024: [place] A/[place], [place], [place], ($0 USED PT/ST)     Current Problem  Problem List Items Addressed This Visit    None        Precautions     No precautions    Pain     No pain     Subjective  General        Not getting up as much to urinate.  Still with fecal incontinence and prolapse.      Objective  + visible sign of prolapse -- EAS not closed.    Resting tone 3 uV... contraction to 7-10 uV    Treatment:  Neuro Re-education:  With verbal consent to rectal examination and treatment  Used biofeedback with rectal sensor -- patient in left sidelying  Kegel, kegel with 10 sec hold, and kegel with hip adduction  Had one episode of pushing sensor out instead of zachary to draw inward.    Therapeutic Exercise:    Bridging x 10   Bridging with hip adduction with ball squeeze x 10   Hip adduction with ball/pillow x 10   Hip abduction in hooklying  with blue band x 10   Clamshell in sidelying:   10   Heel raises x 10   Side stepping with green band around ankles x 10' x 4  Sitting hip IR x 10 with green band and ball squeeze  Discussed exhaling on effort and with transfers     Current HEP:  Access Code: [email or web address]  URL: [email or web address]  Date: 11/25/2024  Prepared by: Fidelina Arreola    Exercises  - Supine Bridge with Pelvic Floor Contraction and Hip Rotation  - 1 x daily - 7 x weekly - 1 sets - 10 reps  - Pelvic Floor Contractions in Hooklying with Adduction  - 1 x daily - 7 x weekly - 1 sets - 10 reps  - Pelvic Floor Contractions in Hooklying with Resisted Abduction  - 1 x daily - 7 x weekly - 1 sets - 10 reps  - Sidelying Clamshell with Pelvic Floor Contraction  - 1 x daily - 7 x weekly - 1 sets - 10 reps  - Standing Heel Raise with Support  - 1 x daily - 7 x weekly - 1  sets - 10 reps  - Sit to Stand with Pelvic Floor Contraction  - 1 x daily - 7 x weekly - 1 sets - 1-2 reps    OP EDUCATION:     Updated HEP  Assessment:     Pt's response to treatment:  Patient able to contract and visualize pelvic floor contraction during biofeedback.  Patient with updated HEP to improve pelvic floor contraction.  Continues with weakness and mismanagement of pressure.    Pain end of session: 0    Plan:     Continue with current POC with the following changes Biofeedback and stim as needed    Assessment of current progress against goals:  Insufficient treatment time to assess progress    Goals:     Pelvic Floor     STGs - 5 sessions  1)  Patient will be knowledgeable with regards to downtraining techniques to improve relaxation of pelvic floor muscles  2)  Patient will report 25% fecal incontinence during walking  3)  Patient will perform diaphragmatic breathing properly to relax and contract pelvic floor muscle appropriately  4)  Patient will demonstrate good transverse abdominis activation to stabilize lumbopelvic girdle during ADLs  5)  Patient will report successful use of urge suppression strategies at least 50% of the time      LTGs - 10 sessions  1)  Patient will report improved sleep with less waking to void (will only wake 1-2 times a night)   2)  Patient will report reduce the amount of times patient urinates during the day to 8  3)  Patient will report 50% less fecal incontinence   4)  Patient will improve pelvic floor contraction to by 1/2-1 MMT  with coordinated exhale for improved continence

## 2025-01-09 ENCOUNTER — APPOINTMENT (OUTPATIENT)
Dept: PHYSICAL THERAPY | Facility: CLINIC | Age: 70
End: 2025-01-09
Payer: MEDICARE

## 2025-01-16 ENCOUNTER — TREATMENT (OUTPATIENT)
Dept: PHYSICAL THERAPY | Facility: CLINIC | Age: 70
End: 2025-01-16
Payer: MEDICARE

## 2025-01-16 DIAGNOSIS — K62.3 RECTAL PROLAPSE: ICD-10-CM

## 2025-01-16 PROCEDURE — 97110 THERAPEUTIC EXERCISES: CPT | Mod: GP | Performed by: PHYSICAL THERAPIST

## 2025-01-16 NOTE — PROGRESS NOTES
Physical Therapy Treatment    Patient Name: Margot Pineda  MRN: [id number]  Encounter date:  1/16/2025  Time Calculation  Start Time: 1515  Stop Time: 1555  Time Calculation (min): 40 min     PT Therapeutic Procedures Time Entry  Therapeutic Exercise Time Entry: 40    Visit Number:  4 (including evaluation)  Planned total visits: 10 per POC  Visits Authorized/Insurance Coverage:  11/20/2024: [place] A/[place], [place], [place], [place], ($0 USED PT/ST)     Current Problem  Problem List Items Addressed This Visit             ICD-10-CM    Rectal prolapse K62.3         Precautions     No precautions    Pain     No pain     Subjective  General        15 minutes late for appointment.    Gets up 2 times a night to uriante.  Walking, long period of standing, Reaching upward causes fecal incontinence and rectal prolapse.  Sees surgeon next week because feel like this is very debilitating.    Objective  Good coordinated breathing,     Treatment:  Therapeutic Exercise:    Bridging x 10 with calf on red theraball   SLR off red theraball x 10   Hip adduction with ball/pillow  lat pull down blue band x 10   Clamshell L1 in sidelying:   10   Clamshell L2   Ball press down  Heel dig ball rolls x 10 red theraball   Heel raises x 10   Sitting hip IR x 10 with green band and ball squeeze  Rows with blue band x 10 (caution as history of neck pain)      Current HEP:  Access Code: [email or web address]  URL: [email or web address]  Date: 11/25/2024  Prepared by: Fidelina Arreola    Exercises  - Supine Bridge with Pelvic Floor Contraction and Hip Rotation  - 1 x daily - 7 x weekly - 1 sets - 10 reps  - Pelvic Floor Contractions in Hooklying with Adduction  - 1 x daily - 7 x weekly - 1 sets - 10 reps  - Pelvic Floor Contractions in Hooklying with Resisted Abduction  - 1 x daily - 7 x weekly - 1 sets - 10 reps  - Sidelying Clamshell with Pelvic Floor Contraction  - 1 x daily - 7 x weekly - 1 sets - 10 reps  - Standing Heel Raise with Support  - 1 x daily -  7 x weekly - 1 sets - 10 reps  - Sit to Stand with Pelvic Floor Contraction  - 1 x daily - 7 x weekly - 1 sets - 1-2 reps    OP EDUCATION:     Updated HEP  Assessment:     Pt's response to treatment:  Patient does well with breathing and completion of all exercises.  At this time no reports of significant change in prolapse.  Patient to see surgeon next week and contact PT if needed to cancel or update on POC.      Pain end of session: 0    Plan:     Continue with current POC with the following changes Biofeedback and stim as needed    Assessment of current progress against goals:  Progressing slowly toward established goals.      Goals:     Pelvic Floor     STGs - 5 sessions  1)  Patient will be knowledgeable with regards to downtraining techniques to improve relaxation of pelvic floor muscles  2)  Patient will report 25% fecal incontinence during walking  3)  Patient will perform diaphragmatic breathing properly to relax and contract pelvic floor muscle appropriately  4)  Patient will demonstrate good transverse abdominis activation to stabilize lumbopelvic girdle during ADLs  5)  Patient will report successful use of urge suppression strategies at least 50% of the time      LTGs - 10 sessions  1)  Patient will report improved sleep with less waking to void (will only wake 1-2 times a night)   2)  Patient will report reduce the amount of times patient urinates during the day to 8  3)  Patient will report 50% less fecal incontinence   4)  Patient will improve pelvic floor contraction to by 1/2-1 MMT  with coordinated exhale for improved continence

## 2025-01-22 ENCOUNTER — APPOINTMENT (OUTPATIENT)
Dept: SURGERY | Facility: CLINIC | Age: 70
End: 2025-01-22
Payer: MEDICARE

## 2025-01-23 ENCOUNTER — APPOINTMENT (OUTPATIENT)
Dept: PHYSICAL THERAPY | Facility: CLINIC | Age: 70
End: 2025-01-23
Payer: MEDICARE

## 2025-01-23 ENCOUNTER — DOCUMENTATION (OUTPATIENT)
Dept: PHYSICAL THERAPY | Facility: CLINIC | Age: 70
End: 2025-01-23
Payer: MEDICARE

## 2025-01-23 NOTE — PROGRESS NOTES
"Physical Therapy                 Therapy Communication Note    Patient Name: Margot Pineda \"Estela\"  MRN: [id number]  Department:   Room: Room/bed info not found  Today's Date: 1/23/2025     Discipline: Physical Therapy      Missed Visit Reason:  canceled because of not seeing surgeon yet and wants to discuss to see if needs to return to [facility] prior to surgeon appointment    Missed Time: Cancel    "

## 2025-01-27 ENCOUNTER — PATIENT MESSAGE (OUTPATIENT)
Dept: PHYSICAL THERAPY | Facility: CLINIC | Age: 70
End: 2025-01-27
Payer: MEDICARE

## 2025-01-30 ENCOUNTER — APPOINTMENT (OUTPATIENT)
Dept: PHYSICAL THERAPY | Facility: CLINIC | Age: 70
End: 2025-01-30
Payer: MEDICARE

## 2025-01-30 ENCOUNTER — DOCUMENTATION (OUTPATIENT)
Dept: PHYSICAL THERAPY | Facility: CLINIC | Age: 70
End: 2025-01-30
Payer: MEDICARE

## 2025-01-30 NOTE — PROGRESS NOTES
"Physical Therapy                 Therapy Communication Note    Patient Name: Margot Pineda \"Estela\"  MRN: [id number]  Today's Date: 1/30/2025     Discipline: Physical Therapy        Missed Visit Reason:  Patient requesting to cancel all of February appointments as has jury duty coming up.  Will try to get into PT in March prior to seeing Dr in April.      Missed Time: Cancel    "

## 2025-02-06 ENCOUNTER — APPOINTMENT (OUTPATIENT)
Dept: PHYSICAL THERAPY | Facility: CLINIC | Age: 70
End: 2025-02-06
Payer: MEDICARE

## 2025-02-13 ENCOUNTER — APPOINTMENT (OUTPATIENT)
Dept: PHYSICAL THERAPY | Facility: CLINIC | Age: 70
End: 2025-02-13
Payer: MEDICARE

## 2025-02-20 ENCOUNTER — APPOINTMENT (OUTPATIENT)
Dept: PHYSICAL THERAPY | Facility: CLINIC | Age: 70
End: 2025-02-20
Payer: MEDICARE

## 2025-03-12 ENCOUNTER — DOCUMENTATION (OUTPATIENT)
Dept: PHYSICAL THERAPY | Facility: CLINIC | Age: 70
End: 2025-03-12
Payer: MEDICARE

## 2025-03-12 NOTE — PROGRESS NOTES
"Physical Therapy    Discharge Summary    Name: Margot Pineda \"Apurva"  MRN: [id number]  : 1955  Date: 25    Discharge Summary: PT    Discharge Information: Date of discharge 3/12/25, Date of last visit 25, Date of evaluation 24, and Number of attended visits 4    Therapy Summary: Has appointment with surgeon in April.  Still having fecal incontinence and rectal prolapse.  Patient feels this is debilitating.  Good coordinated breathing.      Discharge Status: partially achieved all goals.       Rehab Discharge Reason: Failed to schedule and/or keep follow-up appointment(s)  "

## 2025-03-19 NOTE — H&P (VIEW-ONLY)
Margot Pineda is a 69 year old female referred by Veto Nguyễn MD for evaluation of rectal prolapse.    2015 Colonoscopy to cecum  The perianal and digital rectal examinations were normal.  A diminutive polyp was found in the rectum. The polyp was sessile. The polyp was removed with a cold biopsy forceps. Resection and retrieval were complete.  Pathology  A.  POLYP, RECTUM, POLYPECTOMY:    --COLONIC MUCOSA WITH SUPERIFICIAL HYPERPLASTIC FEATURES, SEE NOTE.   Note: No neoplasia is identified.      2024 Met with Dr. Nguyễn-She reported that for the last 4 to 5 years that she has felt a soft tissue mass that would protrude from her anus. She reports that sometimes she will feel this even when walking. She denies any blood per her anus.  However, she has now progressed to the point where she will need to push the mass back inside, and she is avoiding walking because of it.  No prior pregnancies.    She notes that the prolapse started about ten years ago. She did not seek help right away as she was embarrassed and then  her  was in poor health ( from colon cancer ) and she was taking care of him.  The prolapse comes out when on her feel a lot, walking, and during bowel motions.  The prolapse is about the size of a mini Coke can.  She is able to  push the prolapse back into place.  She is having mucous drainage.  Bowel habit is more constipated.  Has been constipated since menopause, about 18 years.She is moving her bowels a couple of times per week.  Stools are round and hard.  She is taking Metamucil one capsule per day and two caps of a stool softener.  Sits on the toilet for only a few minutes.  (+)straining.  No urinary incontinence at all and she has no vaginal/uterine prolapse.      Past Medical History  Osteoarthritis  Rectal Prolapse  Degenerative disc disease    Surgical History  Lipoma excision, shoulder  Oral surgery  Breast biopsy     Social History  Smoking: Quit  "smoking in 1999.   ETOH: Drinks 3-4 alcoholic beverages a week      Family History  Brother:  Prostate cancer  Maternal Aunt: Female cancer    Visit Vitals  /85   Pulse 85   Temp 36.4 °C (97.5 °F)   Resp 18   Ht 1.702 m (5' 7\")   Wt 57.6 kg (127 lb)   BMI 19.89 kg/m²   OB Status Postmenopausal   Smoking Status Former   BSA 1.65 m²     Review of Systems  Constitutional: Negative for fever, chills, anorexia, weight loss, malaise     ENMT: Negative for nasal discharge, congestion, ear pain, mouth pain, throat pain     Respiratory: Negative for cough, hemoptysis, wheezing, shortness of breath     Cardiac: Negative for chest pain, dyspnea on exertion, orthopnea, palpitations, syncope     Gastrointestinal: Negative for nausea, vomiting, diarrhea, constipation, abdominal pain, (+)RECTAL PROLAPSE    Genitourinary: Negative for discharge, dysuria, flank pain, frequency, hematuria     Musculoskeletal: Negative for decreased ROM, pain, swelling, weakness, (+)OSTEOARTHRITIS     Neurological: Negative for dizziness, confusion, headache, seizures, syncope     Psychiatric: Negative for mood changes, anxiety, hallucinations, sleep changes, suicidal ideas, (+)ANXIETY     Skin: Negative for mass, pain, itching, rash, ulcer     Endocrine: Negative for heat intolerance, cold intolerance, excessive sweating, polyuria, excess thirst     Hematologic/Lymph: Negative for anemia, bruising, easy bleeding, night sweats, petechiae, history of DVT/PE or cancer     Allergic/Immunologic: Negative for anaphylaxis, itchy/ teary eyes, itching, sneezing, swelling    Physical Exam  Constitutional: Well developed, awake/alert/oriented x3, no distress, alert and cooperative             Eyes: Sclera anicteric, no conjunctival inflammation, conjugate gaze    ENMT: mucous membranes moist, no apparent injury,            Head/Neck: Neck supple, no apparent injury, No JVD, trachea midline, no bruits              Respiratory/Thorax: Patent airways, CTAB, " normal breath sounds with good chest expansion, thorax symmetric         Cardiovascular: Regular, rate and rhythm, no murmurs, normal S1 and S2         Gastrointestinal: Nondistended, soft, non-tender, no rebound tenderness or guarding, no masses palpable, no organomegaly, +BS, no bruits               Extremities: normal extremities, no cyanosis edema, contusions or wounds, 2+ femoral pulses B/L              Neurological: alert and oriented x3, normal strength, Normal gait          Lymphatic: No palpable inguinal lymphadenopathy   Psychological: Appropriate mood and behavior         Skin: Warm and dry, no lesions, no rashes                Anorectal:  great squeeze and relaxation, No masses - rectal prolapse full thickness.    Impression:  Pt with rectal prolapse   Average risk screening for colon cancer     Plan:    Colonoscopy  Robotic rectopexy  Discussed the risks of leakage and need for a permanent stoma, infection, bleeding, injury to other organs, UTI, wound infection, blood clots, hernia, need for further operation, need for blood products and anesthetic complications. Urinary retention.  Nerve dysfunction.

## 2025-04-09 ENCOUNTER — OFFICE VISIT (OUTPATIENT)
Dept: SURGERY | Facility: CLINIC | Age: 70
End: 2025-04-09
Payer: MEDICARE

## 2025-04-09 VITALS
WEIGHT: 127 LBS | SYSTOLIC BLOOD PRESSURE: 129 MMHG | BODY MASS INDEX: 19.93 KG/M2 | DIASTOLIC BLOOD PRESSURE: 85 MMHG | RESPIRATION RATE: 18 BRPM | TEMPERATURE: 97.5 F | HEIGHT: 67 IN | HEART RATE: 85 BPM

## 2025-04-09 DIAGNOSIS — K62.3 RECTAL PROLAPSE: Primary | ICD-10-CM

## 2025-04-09 PROCEDURE — 3008F BODY MASS INDEX DOCD: CPT | Performed by: COLON & RECTAL SURGERY

## 2025-04-09 PROCEDURE — 1126F AMNT PAIN NOTED NONE PRSNT: CPT | Performed by: COLON & RECTAL SURGERY

## 2025-04-09 PROCEDURE — 1036F TOBACCO NON-USER: CPT | Performed by: COLON & RECTAL SURGERY

## 2025-04-09 PROCEDURE — 1159F MED LIST DOCD IN RCRD: CPT | Performed by: COLON & RECTAL SURGERY

## 2025-04-09 PROCEDURE — 99204 OFFICE O/P NEW MOD 45 MIN: CPT | Performed by: COLON & RECTAL SURGERY

## 2025-04-09 PROCEDURE — 99214 OFFICE O/P EST MOD 30 MIN: CPT | Performed by: COLON & RECTAL SURGERY

## 2025-04-09 RX ORDER — PROGESTERONE 100 MG/1
100 CAPSULE ORAL DAILY
COMMUNITY

## 2025-04-09 RX ORDER — GLUCOSAM/CHONDRO/HERB 149/HYAL 750-100 MG
TABLET ORAL
COMMUNITY

## 2025-04-09 ASSESSMENT — PAIN SCALES - GENERAL: PAINLEVEL_OUTOF10: 0-NO PAIN

## 2025-04-10 ENCOUNTER — HOSPITAL ENCOUNTER (OUTPATIENT)
Facility: HOSPITAL | Age: 70
Setting detail: SURGERY ADMIT
DRG: 331 | End: 2025-04-10
Attending: COLON & RECTAL SURGERY | Admitting: COLON & RECTAL SURGERY
Payer: MEDICARE

## 2025-04-10 DIAGNOSIS — Z12.11 SCREENING FOR COLON CANCER: Primary | ICD-10-CM

## 2025-04-10 DIAGNOSIS — Z12.11 SCREENING FOR COLON CANCER: ICD-10-CM

## 2025-04-10 DIAGNOSIS — K62.3 RECTAL PROLAPSE: Primary | ICD-10-CM

## 2025-04-10 DIAGNOSIS — K62.3 RECTAL PROLAPSE: ICD-10-CM

## 2025-04-10 RX ORDER — HEPARIN SODIUM 5000 [USP'U]/ML
5000 INJECTION, SOLUTION INTRAVENOUS; SUBCUTANEOUS ONCE
OUTPATIENT
Start: 2025-04-10 | End: 2025-04-10

## 2025-04-10 RX ORDER — GABAPENTIN 100 MG/1
CAPSULE ORAL
Qty: 3 CAPSULE | Refills: 0 | Status: SHIPPED | OUTPATIENT
Start: 2025-04-10

## 2025-04-10 RX ORDER — METRONIDAZOLE 500 MG/100ML
500 INJECTION, SOLUTION INTRAVENOUS ONCE
OUTPATIENT
Start: 2025-04-10 | End: 2025-04-10

## 2025-04-10 RX ORDER — SODIUM, POTASSIUM,MAG SULFATES 17.5-3.13G
SOLUTION, RECONSTITUTED, ORAL ORAL
Qty: 360 ML | Refills: 0 | Status: SHIPPED | OUTPATIENT
Start: 2025-04-10

## 2025-04-10 RX ORDER — CHLORHEXIDINE GLUCONATE ORAL RINSE 1.2 MG/ML
SOLUTION DENTAL
Qty: 120 ML | Refills: 0 | Status: SHIPPED | OUTPATIENT
Start: 2025-04-10

## 2025-04-10 RX ORDER — METRONIDAZOLE 250 MG/1
TABLET ORAL
Qty: 3 TABLET | Refills: 0 | Status: SHIPPED | OUTPATIENT
Start: 2025-04-10

## 2025-04-10 RX ORDER — NEOMYCIN SULFATE 500 MG/1
TABLET ORAL
Qty: 6 TABLET | Refills: 0 | Status: SHIPPED | OUTPATIENT
Start: 2025-04-10

## 2025-04-14 DIAGNOSIS — Z79.890 HORMONE REPLACEMENT THERAPY: Primary | ICD-10-CM

## 2025-04-14 RX ORDER — PROGESTERONE 100 MG/1
100 CAPSULE ORAL NIGHTLY
Qty: 90 CAPSULE | Refills: 0 | Status: SHIPPED | OUTPATIENT
Start: 2025-04-14

## 2025-04-30 RX ORDER — PROGESTERONE 100 %
0 POWDER (GRAM) MISCELLANEOUS DAILY
COMMUNITY

## 2025-05-08 ENCOUNTER — HOSPITAL ENCOUNTER (OUTPATIENT)
Dept: GASTROENTEROLOGY | Facility: HOSPITAL | Age: 70
Discharge: HOME | End: 2025-05-08
Payer: MEDICARE

## 2025-05-08 ENCOUNTER — APPOINTMENT (OUTPATIENT)
Dept: GASTROENTEROLOGY | Facility: HOSPITAL | Age: 70
End: 2025-05-08
Payer: MEDICARE

## 2025-05-08 VITALS
SYSTOLIC BLOOD PRESSURE: 114 MMHG | WEIGHT: 126.76 LBS | BODY MASS INDEX: 19.9 KG/M2 | DIASTOLIC BLOOD PRESSURE: 57 MMHG | HEART RATE: 80 BPM | HEIGHT: 67 IN | RESPIRATION RATE: 16 BRPM | OXYGEN SATURATION: 100 % | TEMPERATURE: 97 F

## 2025-05-08 DIAGNOSIS — Z12.11 SCREENING FOR COLON CANCER: ICD-10-CM

## 2025-05-08 PROCEDURE — 3700000012 HC SEDATION LEVEL 5+ TIME - INITIAL 15 MINUTES 5/> YEARS: Performed by: STUDENT IN AN ORGANIZED HEALTH CARE EDUCATION/TRAINING PROGRAM

## 2025-05-08 PROCEDURE — 99153 MOD SED SAME PHYS/QHP EA: CPT | Performed by: COLON & RECTAL SURGERY

## 2025-05-08 PROCEDURE — 2500000004 HC RX 250 GENERAL PHARMACY W/ HCPCS (ALT 636 FOR OP/ED): Mod: JZ | Performed by: STUDENT IN AN ORGANIZED HEALTH CARE EDUCATION/TRAINING PROGRAM

## 2025-05-08 PROCEDURE — 2500000005 HC RX 250 GENERAL PHARMACY W/O HCPCS: Performed by: STUDENT IN AN ORGANIZED HEALTH CARE EDUCATION/TRAINING PROGRAM

## 2025-05-08 PROCEDURE — 99152 MOD SED SAME PHYS/QHP 5/>YRS: CPT | Performed by: COLON & RECTAL SURGERY

## 2025-05-08 PROCEDURE — 45378 DIAGNOSTIC COLONOSCOPY: CPT | Performed by: COLON & RECTAL SURGERY

## 2025-05-08 PROCEDURE — 7100000009 HC PHASE TWO TIME - INITIAL BASE CHARGE: Performed by: STUDENT IN AN ORGANIZED HEALTH CARE EDUCATION/TRAINING PROGRAM

## 2025-05-08 PROCEDURE — 3700000013 HC SEDATION LEVEL 5+ TIME - EACH ADDITIONAL 15 MINUTES: Performed by: STUDENT IN AN ORGANIZED HEALTH CARE EDUCATION/TRAINING PROGRAM

## 2025-05-08 PROCEDURE — 7100000010 HC PHASE TWO TIME - EACH INCREMENTAL 1 MINUTE: Performed by: STUDENT IN AN ORGANIZED HEALTH CARE EDUCATION/TRAINING PROGRAM

## 2025-05-08 PROCEDURE — G0105 COLORECTAL SCRN; HI RISK IND: HCPCS | Performed by: COLON & RECTAL SURGERY

## 2025-05-08 RX ORDER — MIDAZOLAM HYDROCHLORIDE 1 MG/ML
INJECTION INTRAMUSCULAR; INTRAVENOUS AS NEEDED
Status: COMPLETED | OUTPATIENT
Start: 2025-05-08 | End: 2025-05-08

## 2025-05-08 RX ORDER — FENTANYL CITRATE 50 UG/ML
INJECTION, SOLUTION INTRAMUSCULAR; INTRAVENOUS AS NEEDED
Status: COMPLETED | OUTPATIENT
Start: 2025-05-08 | End: 2025-05-08

## 2025-05-08 RX ADMIN — FENTANYL CITRATE 75 MCG: 50 INJECTION, SOLUTION INTRAMUSCULAR; INTRAVENOUS at 12:08

## 2025-05-08 RX ADMIN — MIDAZOLAM HYDROCHLORIDE 3 MG: 1 INJECTION, SOLUTION INTRAMUSCULAR; INTRAVENOUS at 12:08

## 2025-05-08 RX ADMIN — Medication 2 L/MIN: at 12:06

## 2025-05-08 ASSESSMENT — PAIN SCALES - GENERAL

## 2025-05-08 ASSESSMENT — COLUMBIA-SUICIDE SEVERITY RATING SCALE - C-SSRS
2. HAVE YOU ACTUALLY HAD ANY THOUGHTS OF KILLING YOURSELF?: NO
6. HAVE YOU EVER DONE ANYTHING, STARTED TO DO ANYTHING, OR PREPARED TO DO ANYTHING TO END YOUR LIFE?: NO
1. IN THE PAST MONTH, HAVE YOU WISHED YOU WERE DEAD OR WISHED YOU COULD GO TO SLEEP AND NOT WAKE UP?: NO

## 2025-05-08 ASSESSMENT — PAIN - FUNCTIONAL ASSESSMENT
PAIN_FUNCTIONAL_ASSESSMENT: 0-10

## 2025-05-08 NOTE — LETTER
[phone]      Dear Ms. Pineda,       It was my pleasure to see you at your recent colonoscopy.  At that time,  your examination was completely normal.  The current recommendation is to repeat the colonoscopy in 10 years unless a first degree relative develops colon cancer then it decreases to 5 years.  I hope that you have recovered well from the prolapse procedure.      Thank you very much for allowing me to take part in your care, please feel free to contact me with any questions or concerns at [phone].          Sincerely,       Nikia Berrios M.D. FACS, FASCRS    CC:  Primary Care:

## 2025-05-08 NOTE — DISCHARGE INSTRUCTIONS
Patient Instructions after a Colonoscopy      The anesthetics, sedatives or narcotics which were given to you today will be acting in your body for the next 24 hours, so you might feel a little sleepy or groggy.  This feeling should slowly wear off. Carefully read and follow the instructions.     You received sedation today:  - Do not drive or operate any machinery or power tools of any kind.   - No alcoholic beverages today, not even beer or wine.  - Do not make any important decisions or sign any legal documents.  - No over the counter medications that contain alcohol or that may cause drowsiness.  - Do not make any important decisions or sign any legal documents.  - Make sure you have someone with you for first 24 hours.    While it is common to experience mild to moderate abdominal distention, gas, or belching after your procedure, if any of these symptoms occur following discharge from the GI Lab or within one week of having your procedure, call the [facility] to be advised whether a visit to your nearest Urgent Care or Emergency Department is indicated.  Take this paper with you if you go.     - If you develop an allergic reaction to the medications that were given during your procedure such as difficulty breathing, rash, hives, severe nausea, vomiting or lightheadedness.  - If you experience chest pain, shortness of breath, severe abdominal pain, fevers and chills.  -If you develop signs and symptoms of bleeding such as blood in your spit, if your stools turn black, tarry, or bloody  - If you have not urinated within 8 hours following your procedure.  - If your IV site becomes painful, red, inflamed, or looks infected.    If you received a biopsy/polypectomy/sphincterotomy the following instructions apply below:    __ Do not use Aspirin containing products, non-steroidal medications or anti-coagulants for one week following your procedure. (Examples of these types of medications are: Advil,  Arthrotec, Aleve, Coumadin, Ecotrin, Heparin, Ibuprofen, Indocin, Motrin, Naprosyn, Nuprin, Plavix, Vioxx, and Voltarin, or their generic forms.  This list is not all-inclusive.  Check with your physician or pharmacist before resuming medications.)   __ Eat a soft diet today.  Avoid foods that are poorly digested for the next 24 hours.  These foods would include: nuts, beans, lettuce, red meats, and fried foods. Start with liquids and advance your diet as tolerated, gradually work up to eating solids.   __ Do not have a Barium Study or Enema for one week.    Your physician recommends the additional following instructions:    -You have a contact number available for emergencies. The signs and symptoms of potential delayed complications were discussed with you. You may return to normal activities tomorrow.  -Resume your previous diet.  -Continue your present medications.   -We are waiting for your pathology results.  -Your physician has recommended a repeat colonoscopy (date to be determined after pending pathology results are reviewed) for surveillance based on pathology results.  -The findings and recommendations have been discussed with you.  -The findings and recommendations were discussed with your family.  - Please see Medication Reconciliation Form for new medication/medications prescribed.       If you experience any problems or have any questions following discharge from the GI Lab, please call:        Nurse Signature                                                                        Date___________________                                                                            Patient/Responsible Party Signature                                        Date___________________

## 2025-05-15 ENCOUNTER — CLINICAL SUPPORT (OUTPATIENT)
Dept: PREADMISSION TESTING | Facility: HOSPITAL | Age: 70
End: 2025-05-15
Payer: MEDICARE

## 2025-05-15 DIAGNOSIS — Z79.890 HORMONE REPLACEMENT THERAPY: ICD-10-CM

## 2025-05-15 RX ORDER — DOCUSATE SODIUM 100 MG/1
100 CAPSULE, LIQUID FILLED ORAL 2 TIMES DAILY PRN
COMMUNITY

## 2025-05-15 RX ORDER — BISMUTH SUBSALICYLATE 262 MG
1 TABLET,CHEWABLE ORAL DAILY PRN
COMMUNITY

## 2025-05-15 RX ORDER — UBIDECARENONE/VIT E ACET 100MG-5
CAPSULE ORAL DAILY PRN
COMMUNITY

## 2025-05-15 RX ORDER — CALCIUM CARBONATE 500(1250)
1 TABLET ORAL
COMMUNITY

## 2025-05-15 RX ORDER — BLOOD PRESSURE TEST KIT-WRIST
KIT MISCELLANEOUS AS NEEDED
COMMUNITY

## 2025-05-16 RX ORDER — ESTRADIOL 0.5 MG/1
0.5 TABLET ORAL DAILY
Qty: 28 TABLET | Refills: 0 | Status: SHIPPED | OUTPATIENT
Start: 2025-05-16

## 2025-05-19 ENCOUNTER — LAB (OUTPATIENT)
Dept: LAB | Facility: HOSPITAL | Age: 70
End: 2025-05-19
Payer: MEDICARE

## 2025-05-19 ENCOUNTER — PRE-ADMISSION TESTING (OUTPATIENT)
Dept: PREADMISSION TESTING | Facility: HOSPITAL | Age: 70
End: 2025-05-19
Payer: MEDICARE

## 2025-05-19 VITALS
HEART RATE: 78 BPM | TEMPERATURE: 97 F | OXYGEN SATURATION: 99 % | BODY MASS INDEX: 20.07 KG/M2 | SYSTOLIC BLOOD PRESSURE: 154 MMHG | DIASTOLIC BLOOD PRESSURE: 86 MMHG | WEIGHT: 127.87 LBS | HEIGHT: 67 IN | RESPIRATION RATE: 16 BRPM

## 2025-05-19 DIAGNOSIS — Z01.818 ENCOUNTER FOR OTHER PREPROCEDURAL EXAMINATION: Primary | ICD-10-CM

## 2025-05-19 DIAGNOSIS — Z01.818 PREOP TESTING: Primary | ICD-10-CM

## 2025-05-19 LAB
ABO GROUP (TYPE) IN BLOOD: NORMAL
ANION GAP SERPL CALC-SCNC: 11 MMOL/L (ref 10–20)
ANTIBODY SCREEN: NORMAL
BASOPHILS # BLD AUTO: 0.05 X10*3/UL (ref 0–0.1)
BASOPHILS NFR BLD AUTO: 1.2 %
BUN SERPL-MCNC: 17 MG/DL (ref 6–23)
CALCIUM SERPL-MCNC: 9.6 MG/DL (ref 8.6–10.3)
CHLORIDE SERPL-SCNC: 97 MMOL/L (ref 98–107)
CO2 SERPL-SCNC: 25 MMOL/L (ref 21–32)
CREAT SERPL-MCNC: 0.83 MG/DL (ref 0.5–1.05)
EGFRCR SERPLBLD CKD-EPI 2021: 77 ML/MIN/1.73M*2
EOSINOPHIL # BLD AUTO: 0.05 X10*3/UL (ref 0–0.7)
EOSINOPHIL NFR BLD AUTO: 1.2 %
ERYTHROCYTE [DISTWIDTH] IN BLOOD BY AUTOMATED COUNT: 14.7 % (ref 11.5–14.5)
GLUCOSE SERPL-MCNC: 89 MG/DL (ref 74–99)
HCT VFR BLD AUTO: 33.8 % (ref 36–46)
HGB BLD-MCNC: 11.4 G/DL (ref 12–16)
IMM GRANULOCYTES # BLD AUTO: 0.03 X10*3/UL (ref 0–0.7)
IMM GRANULOCYTES NFR BLD AUTO: 0.7 % (ref 0–0.9)
LYMPHOCYTES # BLD AUTO: 1 X10*3/UL (ref 1.2–4.8)
LYMPHOCYTES NFR BLD AUTO: 24.1 %
MCH RBC QN AUTO: 28.6 PG (ref 26–34)
MCHC RBC AUTO-ENTMCNC: 33.7 G/DL (ref 32–36)
MCV RBC AUTO: 85 FL (ref 80–100)
MONOCYTES # BLD AUTO: 0.48 X10*3/UL (ref 0.1–1)
MONOCYTES NFR BLD AUTO: 11.6 %
NEUTROPHILS # BLD AUTO: 2.54 X10*3/UL (ref 1.2–7.7)
NEUTROPHILS NFR BLD AUTO: 61.2 %
NRBC BLD-RTO: 0 /100 WBCS (ref 0–0)
PLATELET # BLD AUTO: 313 X10*3/UL (ref 150–450)
POTASSIUM SERPL-SCNC: 4.6 MMOL/L (ref 3.5–5.3)
RBC # BLD AUTO: 3.98 X10*6/UL (ref 4–5.2)
RH FACTOR (ANTIGEN D): NORMAL
SODIUM SERPL-SCNC: 128 MMOL/L (ref 136–145)
WBC # BLD AUTO: 4.2 X10*3/UL (ref 4.4–11.3)

## 2025-05-19 PROCEDURE — 85025 COMPLETE CBC W/AUTO DIFF WBC: CPT

## 2025-05-19 PROCEDURE — 86900 BLOOD TYPING SEROLOGIC ABO: CPT

## 2025-05-19 PROCEDURE — 86901 BLOOD TYPING SEROLOGIC RH(D): CPT

## 2025-05-19 PROCEDURE — 80048 BASIC METABOLIC PNL TOTAL CA: CPT

## 2025-05-19 PROCEDURE — 36415 COLL VENOUS BLD VENIPUNCTURE: CPT

## 2025-05-19 PROCEDURE — 86850 RBC ANTIBODY SCREEN: CPT

## 2025-05-19 PROCEDURE — 99204 OFFICE O/P NEW MOD 45 MIN: CPT | Performed by: NURSE PRACTITIONER

## 2025-05-19 PROCEDURE — 87081 CULTURE SCREEN ONLY: CPT | Mod: AHULAB | Performed by: NURSE PRACTITIONER

## 2025-05-19 RX ORDER — CHLORHEXIDINE GLUCONATE ORAL RINSE 1.2 MG/ML
SOLUTION DENTAL
Qty: 473 ML | Refills: 0 | Status: SHIPPED | OUTPATIENT
Start: 2025-05-19

## 2025-05-19 ASSESSMENT — ENCOUNTER SYMPTOMS
CARDIOVASCULAR NEGATIVE: 1
ENDOCRINE NEGATIVE: 1
CONSTIPATION: 1
NECK NEGATIVE: 1
MUSCULOSKELETAL NEGATIVE: 1
NEUROLOGICAL NEGATIVE: 1
RESPIRATORY NEGATIVE: 1
CONSTITUTIONAL NEGATIVE: 1

## 2025-05-19 NOTE — PREPROCEDURE INSTRUCTIONS
Medication List            Accurate as of May 19, 2025  1:33 PM. Always use your most recent med list.                joshua extract 500 mg capsule  Additional Medication Adjustments for Surgery: Take last dose 7 days before surgery     BIOTIN ORAL  Additional Medication Adjustments for Surgery: Take last dose 7 days before surgery     calcium carbonate 1,250 mg (500 mg elemental) tablet  Commonly known as: Os-Jose A  Additional Medication Adjustments for Surgery: Take last dose 7 days before surgery     chlorhexidine 0.12 % solution  Commonly known as: Peridex  SWISH AND SPIT 15ML FOR 30 SECONDS NIGHT PRIOR TO SURGERY AND MORNING OF SURGERY  Medication Adjustments for Surgery: Take/Use as prescribed     docusate sodium 100 mg capsule  Commonly known as: Colace  Medication Adjustments for Surgery: Do Not take on the morning of surgery     estradiol 0.5 mg tablet  Commonly known as: Estrace  TAKE 1 TABLET (0.5 MG) BY MOUTH ONCE DAILY.  Medication Adjustments for Surgery: Take/Use as prescribed     HEALTHY EYES LUTEIN-ZEAXANTHIN ORAL  Additional Medication Adjustments for Surgery: Take last dose 7 days before surgery     multivitamin tablet  Additional Medication Adjustments for Surgery: Take last dose 7 days before surgery     NON FORMULARY  Additional Medication Adjustments for Surgery: Take last dose 7 days before surgery     NON FORMULARY  Additional Medication Adjustments for Surgery: Take last dose 7 days before surgery     NON FORMULARY  Additional Medication Adjustments for Surgery: Take last dose 7 days before surgery     omega 3-dha-epa-fish oil 910-1,400 mg capsule  Additional Medication Adjustments for Surgery: Take last dose 7 days before surgery     OSTEO BI-FLEX ORAL  Additional Medication Adjustments for Surgery: Take last dose 7 days before surgery     PROBIOTIC BLEND ORAL  Medication Adjustments for Surgery: Do Not take on the morning of surgery     progesterone 100 mg capsule  Commonly known  as: Prometrium  TAKE 1 CAPSULE (100 MG) BY MOUTH ONCE DAILY AT BEDTIME.  Medication Adjustments for Surgery: Take/Use as prescribed     psyllium 3.4 gram packet  Commonly known as: Metamucil  Medication Adjustments for Surgery: Do Not take on the morning of surgery     resveratroL 100 mg capsule  Additional Medication Adjustments for Surgery: Take last dose 7 days before surgery     VITAMIN K2 ORAL  Additional Medication Adjustments for Surgery: Take last dose 7 days before surgery                Preoperative Brain Exercises    What are brain exercises?  A brain exercise is any activity that engages your thinking (cognitive) skills.    What types of activities are considered brain exercises?  Jigsaw puzzles, crossword puzzles, word jumble, memory games, word search, and many more.  Many can be found free online or on your phone via a mobile mariah.    Why should I do brain exercises before my surgery?  More recent research has shown brain exercise before surgery can lower the risk of postoperative delirium (confusion) which can be especially important for older adults.  Patients who did brain exercises for 5 to 10 hours (total) for the 7-10 days before surgery, cut their risk of postoperative delirium in half up to 1 week after surgery.        Preoperative Deep Breathing Exercises  Why it is important to do deep breathing exercises before my surgery?  Deep breathing exercises strengthen your breathing muscles.  This helps you to recover after your surgery and decreases the chance of breathing complications.  How are the deep breathing exercises done?  Sit straight with your back supported.  Breathe in deeply and slowly through your nose. Your lower rib cage should expand and your abdomen may move forward.  Hold that breath for 3 to 5 seconds.  Breathe out through pursed lips, slowly and completely.  Rest and repeat 10 times every hour while awake.  Rest longer if you become dizzy or lightheaded.        CONTACT  SURGEON'S OFFICE IF YOU DEVELOP:  * Fever = 100.4 F   * New respiratory symptoms (e.g. cough, shortness of breath, respiratory distress, sore throat)  * Recent loss of taste or smell  *Flu like symptoms such as headache, fatigue or gastrointestinal symptoms  * You develop any open sores, shingles, burning or painful urination   AND/OR:  * You no longer wish to have the surgery.  * Any other personal circumstances change that may lead to the need to cancel or defer this surgery.  *You were admitted to any hospital within one week of your planned procedure.    SMOKING:  *Quitting smoking can make a huge difference to your health and recovery from surgery.    *If you need help with quitting, call 3-297-QUIT-NOW.    THE DAY OF SURGERY:  *Do not eat any food after midnight the night before your surgery.   *YOU MUST drink 14 OUNCES of clear liquids TWO hours before your instructed ARRIVAL TIME to the hospital. This includes water, black tea/coffee (no milk or cream), apple juice, clear broth and electrolyte drinks (Gatorade).  Please avoid clear liquids that are red in color.   *You may chew gum/mints up to TWO hours before your surgery/procedure.    SURGICAL TIME:  *You will be contacted between 2 p.m. and 6 p.m. the business day before your surgery with your arrival time.  *If you haven't received a call by 6pm, call [phone].  *Scheduled surgery times may change and you will be notified if this occurs-check your personal voicemail for any updates.    ON THE MORNING OF SURGERY:  *Wear comfortable, loose fitting clothing.   *Do not use moisturizers, creams, lotions or perfume.  *All jewelry and valuables should be left at home.  *Prosthetic devices such as contact lenses, hearing aids, dentures, eyelash extensions, hairpins and body piercing must be removed before surgery.    BRING WITH YOU:  *Photo ID and insurance card  *Current list of medications and allergies  *Pacemaker/Defibrillator/Heart stent cards  *CPAP  machine and mask  *Slings/splints/crutches  *Copy of your complete Advanced Directive/DHPOA-if applicable  *Neurostimulator implant remote    PARKING AND ARRIVAL:  *Check in at the Main Entrance desk and let them know you are here for surgery.  *You will be directed to the 2nd floor surgical waiting area.    IF YOU ARE HAVING OUTPATIENT/SAME DAY SURGERY:  *A responsible adult MUST accompany you at the time of discharge and stay with you for 24 hours after your surgery.  *You may NOT drive yourself home after surgery.  *You may use a taxi or ride sharing service ([employer], [employer]) to return home ONLY if you are accompanied by a friend or family member.  *Instructions for resuming your medications will be provided by your surgeon.    Home Preoperative Antibacterial Shower     What is a home preoperative antibacterial shower?  This shower is a way of cleaning the skin with a germ killing soap before surgery.  The soap contains chlorhexidine, commonly known as CHG.  CHG is a soap for your skin with germ killing ability.  Let your doctor know if you are allergic to chlorhexidine.    Why do I need to take a preoperative antibacterial shower?  Skin is not sterile.  It is best to try to make your skin as free of germs as possible before surgery.  Proper cleansing with a germ killing soap before surgery can lower the number of germs on your skin.  This helps to reduce the risk of infection at the surgical site.  Following the instructions listed below will help you prepare your skin for surgery.      How do I use the CHG skin cleanser?  Steps:  Begin using your CHG soap five days before your scheduled surgery on ________________________.    Days 1-4 Shower before bed:  Wash your face and genitals with your normal soap and rinse.           2.    Apply the CHG soap to a clean wet washcloth.  Turn the water off or move away                From the water spray to avoid premature rinsing of the CHG soap as you are applying.     3.    Lather your entire body from the neck down.  Do not use on your face or genitals.  4. Pay special attention to the area(s) where your incision(s) will be located unless they are on your face.  Avoid scrubbing your skin too hard.  The important point is to have the CHG soap sit on your skin for 3 minutes.    When the 3 minutes are up, turn on the water and rinse the CHG soap off your body completely.   Pat yourself dry with a clean, freshly-laundered towel.  Dress in clean, freshly laundered night clothes.    Be sure to change bed sheets and blankets at least on the first night of CHG body wash use. May change linens every night of the above protocol for maximum benefit.   Day 5:  Last shower is the morning of surgery: Follow above Instructions.    NOTE:        *Keep CHG soap out of eyes and ear canals   *DO NOT wash with regular soap on your body after you have used the CHG soap solution  *DO NOT apply powders, lotions, or perfume.  *Deodorant may be used days 1-4, BUT NOT the day of surgery    Who should I contact if I have any questions regarding the use of CHG soap?  Call the [facility], Preadmission Testing at [phone] if you have any questions.              Patient Information: Pre-Operative Infection Prevention Measures     Why did I have my nose, under my arms and groin swabbed?  The purpose of the swab is to identify Staphylococcus aureus inside your nose or on your skin.  The swab was sent to the laboratory for culture.  A positive swab/culture for Staphylococcus aureus is called colonization or carriage.      What is Staphylococcus aureus?  Staphylococcus aureus, also known as “staph”, is a germ found on the skin or in the nose of healthy people.  Sometimes Staphylococcus aureus can get into the body and cause an infection.  This can be minor (such as pimples, boils or other skin problems).  It might also be serious (such as blood infection, pneumonia or a surgical site  infection).    What is Staphylococcus aureus colonization or carriage?  Colonization or carriage means that a person has the germ but is not sick from it.  These bacteria can be spread on the hands or when breathing or sneezing.    How is Staphylococcus aureus spread?  It is most often spread by close contact with a person or item that carries it.    What happens if my culture is positive for Staphylococcus aureus?  Your doctor/medical team will use this information to guide any antibiotic treatment which may be necessary.  Regardless of the culture results, we will clean the inside of your nose with a betadine swab just before you have your surgery.      Will I get an infection if I have Staphylococcus aureus in my nose or on my skin?  Anyone can get an infection with Staphylococcus aureus.  However, the best way to reduce your risk of infection is to follow the instructions provided to you for the use of your CHG soap and dental rinse.        Who should I contact if I have any questions?  Call the [facility], Preadmission Testing at [phone] if you have any questions.          Patient Information: Oral/Dental Rinse  **This is a prescription; pick it up at your preferred local pharmacy **  What is oral/dental rinse?   It is a mouthwash. It is a way of cleaning the mouth with a germ killing solution before your surgery.  The solution contains chlorhexidine, commonly known as CHG.   It is used inside the mouth to kill a bacteria known as Staphylococcus aureus.  Let your doctor know if you are allergic to Chlorhexidine.    Why do I need to use CHG oral/dental rinse?  The CHG oral/dental rinse helps to kill a bacteria in your mouth known a Staphylococcus aureus.     This reduces the risk of infection at the surgical site.      Using your CHG oral/dental rinse  STEPS:  Use your CHG oral/dental rinse after you brush your teeth the night before (at bedtime) and the morning of your  surgery.  Follow all directions on your prescription label.    Use the cap on the container to measure 15ml (fill cap to fill line)  Swish (gargle if you can) the mouthwash in your mouth for at least 30 seconds, (do not to swallow) spit out  After you use your CHG rinse, do not rinse your mouth with water, drink or eat.  Please refer to prescription label for the appropriate time to resume oral intake  Dental rinse comes in one size bottle: 473ml ~16oz.  You will have leftover    rinse, discard after this use.    What side effects might I have using the CHG oral/dental rinse?  CHG rinse will stick to plaque on the teeth.  Brush and floss just before use.  Teeth brushing will help avoid staining of plaque during use.    Who should I contact if I have questions about the CHG oral/dental rinse?  Please call [facility], Preadmission Testing at [phone] if you have any questions

## 2025-05-19 NOTE — CPM/PAT NURSE NOTE
"CPM/PAT Nurse Note      Name: Margot Pineda (Margot Pineda \"Estela\")  /Age: [birth year]//69 y.o.       Medical History[1]    Surgical History[2]    Patient Sexual activity questions deferred to the physician.    Family History[3]    Allergies[4]    Prior to Admission medications    Medication Sig Start Date End Date Taking? Authorizing Provider   ashwagandha extract 500 mg capsule Take by mouth if needed.   Yes Historical Provider, MD   BIOTIN ORAL Take 1 tablet by mouth once daily as needed.   Yes Historical Provider, MD   calcium carbonate (Os-Jose A) 1,250 mg (500 mg elemental) tablet Take 1 tablet by mouth 2 times daily (morning and late afternoon).   Yes Historical Provider, MD   docusate sodium (Colace) 100 mg capsule Take 1 capsule (100 mg) by mouth 2 times a day as needed for constipation.   Yes Historical Provider, MD   estradiol (Estrace) 0.5 mg tablet TAKE 1 TABLET (0.5 MG) BY MOUTH ONCE DAILY. 25  Yes BELINDA Hoang-CNP   glucosamine/chondr hoffmann A sod (OSTEO BI-FLEX ORAL) Take by mouth once daily.   Yes Historical Provider, MD   L.acid/L.casei/B.bif/B.tigre/FOS (PROBIOTIC BLEND ORAL) Take by mouth if needed.   Yes Historical Provider, MD   multivitamin tablet Take 1 tablet by mouth once daily as needed.   Yes Historical Provider, MD   NON FORMULARY if needed. Magnesium glycinate with Ashwagandha   Yes Historical Provider, MD   NON FORMULARY 3 times a week. Liver detox   Yes Historical Provider, MD   NON FORMULARY Take 1 each by mouth once daily. Collagen gummy   Yes Historical Provider, MD   omega 3-dha-epa-fish oil 910-1,400 mg capsule Take by mouth once daily.   Yes Historical Provider, MD   progesterone (Prometrium) 100 mg capsule TAKE 1 CAPSULE (100 MG) BY MOUTH ONCE DAILY AT BEDTIME. 25  Yes Maya Quiros MD   psyllium (Metamucil) 3.4 gram packet Take 1 packet by mouth once daily.   Yes Historical Provider, MD   resveratroL 100 mg capsule Take by mouth once daily as needed.   Yes " Historical Provider, MD   vit C/E/Zn/coppr/lutein/zeaxan (HEALTHY EYES LUTEIN-ZEAXANTHIN ORAL) Take by mouth once daily.   Yes Historical Provider, MD   VITAMIN K2 ORAL Take by mouth if needed.   Yes Historical Provider, MD   chlorhexidine (Peridex) 0.12 % solution SWISH AND SPIT 15ML FOR 30 SECONDS NIGHT PRIOR TO SURGERY AND MORNING OF SURGERY 5/19/25   BELINDA Lind-CNP   estradiol (Estrace) 0.5 mg tablet Take 1 tablet (0.5 mg) by mouth once daily.  Patient taking differently: Take 0.5 tablets (0.25 mg) by mouth once daily. 3/19/24 5/16/25  Maya Quiros MD   progesterone, bulk, 100 % powder powder Take 0.0025 g by mouth once daily.  5/15/25  Historical Provider, MD JORDYN CARVALHO     DASI Risk Score      Flowsheet Row Questionnaire Series Submission from 5/11/2025 in [facility] with Generic Provider Edna   Can you take care of yourself (eat, dress, bathe, or use toilet)?  2.75  filed at 05/11/2025 0021   Can you walk indoors, such as around your house? 1.75  filed at 05/11/2025 0021   Can you walk a block or two on level ground?  2.75  filed at 05/11/2025 0021   Can you climb a flight of stairs or walk up a hill? 5.5  filed at 05/11/2025 0021   Can you run a short distance? 8  filed at 05/11/2025 0021   Can you do light work around the house like dusting or washing dishes? 2.7  filed at 05/11/2025 0021   Can you do moderate work around the house like vacuuming, sweeping floors or carrying groceries? 3.5  filed at 05/11/2025 0021   Can you do heavy work around the house like scrubbing floors or lifting and moving heavy furniture?  8  filed at 05/11/2025 0021   Can you do yard work like raking leaves, weeding or pushing a mower? 4.5  filed at 05/11/2025 0021   Can you have sexual relations? 5.25  filed at 05/11/2025 0021   Can you participate in moderate recreational activities like golf, bowling, dancing, doubles tennis or throwing a baseball or football? 6  filed at 05/11/2025 0021   Can you  participate in strenous sports like swimming, singles tennis, football, basketball, or skiing? 0  filed at 05/11/2025 0021   DASI SCORE 50.7  filed at 05/11/2025 0021   METS Score (Will be calculated only when all the questions are answered) 9  filed at 05/11/2025 0021          Caprini DVT Assessment    No data to display       Modified Frailty Index    No data to display       [id number] Stroke Risk Points  Current as of 24 minutes ago        N/A 0 to 9 Points:      Last Change: N/A          The [id number] risk score (Lip SARAH BETH, et al. 2009. © 2010 [employer]) quantifies the risk of stroke for a patient with atrial fibrillation. For patients without atrial fibrillation or under the age of 18 this score appears as N/A. Higher score values generally indicate higher risk of stroke.        This score is not applicable to this patient. Components are not calculated.          Revised Cardiac Risk Index    No data to display       Apfel Simplified Score    No data to display       Risk Analysis Index Results This Encounter    No data found in the last 10 encounters.       Stop Bang Score      Flowsheet Row Questionnaire Series Submission from 5/11/2025 in [facility] with Generic Provider Edna   Do you snore loudly? 0  filed at 05/11/2025 0021   Do you often feel tired or fatigued after your sleep? 0  filed at 05/11/2025 0021   Has anyone ever observed you stop breathing in your sleep? 0  filed at 05/11/2025 0021   Do you have or are you being treated for high blood pressure? 0  filed at 05/11/2025 0021   Recent BMI (Calculated) 19.9  filed at 05/11/2025 0021   Is BMI greater than 35 kg/m2? 0=No  filed at 05/11/2025 0021   Age older than 50 years old? 1=Yes  filed at 05/11/2025 0021   Gender - Male 0=No  filed at 05/11/2025 0021          Prodigy: High Risk  Total Score: 8              Prodigy Age Score           ARISCAT Score for Postoperative Pulmonary Complications    No data to display        Molly Perioperative Risk for Myocardial Infarction or Cardiac Arrest (JESSY)    No data to display         Nurse Plan of Action:     After Visit Summary (AVS) reviewed and patient verbalized good understanding of medications and NPO instructions.  Pre-op infection prevention measures:  CHG showers and mouthwash reviewed, understanding voiced.  CHG soap given and patient verbalized need to pick CHG mouthwash at their preferred local pharmacy.              [1]   Past Medical History:  Diagnosis Date    Anxiety 1963?    Chronic constipation     Depression ?    Eating disorder 1972    Eczema Winter    Inflammatory bowel disease Maybe?    PONV (postoperative nausea and vomiting) 4/2015    Was given shot for headache while waiting fir colonoscopy, which worked but caused dry heaving    Rectal prolapse    [2]   Past Surgical History:  Procedure Laterality Date    BREAST BIOPSY      COLONOSCOPY      DENTAL SURGERY      DILATION AND CURETTAGE OF UTERUS      EYE SURGERY  2015    LIPOMA RESECTION      Shoulder   [3]   Family History  Problem Relation Name Age of Onset    Stroke Mother Dorinda     Osteoporosis Mother Dorinda     Dementia Mother Dorinda     Arthritis Mother Dorinda     Dementia Father      Depression Sister Zuleyka     Anxiety disorder Sister Zuleyka     Mental illness Sister Zuleyka         UNDETERMINED    Cancer Brother Bill     Dementia Brother      Dementia Brother      Fibrocystic breast disease Mother's Sister      Other (m aunt br ca hx per pt) Other     [4] No Known Allergies

## 2025-05-19 NOTE — H&P (VIEW-ONLY)
"[facility]/PAT Evaluation       Name: Margot Pineda (Margot Pineda \"Estela\")  /Age: 1955/69 y.o.     In-Person         Date of Consult: 25    Referring Provider:  Dr. Berrios    Date, Surgery, and Length:  25, robotic assisted rectopexy, 265 minutes      Patient presents to [facility] for perioperative risk assessment prior to scheduled surgery. Patient presents with rectal prolapse. She reports that for the last 4 to 5 years that she has felt a soft tissue mass that would protrude from her anus. She reports that sometimes she will feel this even when walking. She denies any blood per her anus.  However, she has now progressed to the point where she will need to push the mass back inside, and she is avoiding walking because of it.       This note was created in part upon personal review of patient's medical records.      Pt denies any past history of anesthetic complications such as PONV, awareness, prolonged sedation, dental damage, aspiration, cardiac arrest, difficult intubation, difficult I.V. access or unexpected hospital admissions. No history of malignant hyperthermia and or pseudocholinesterase deficiency.    No history of blood transfusions.    The patient IS NOT a [employer] and will accept blood and blood products if medically indicated.     Type and screen WAS sent.      Past Medical History:   Diagnosis Date    Anxiety ?    Chronic constipation     Depression ?    Eating disorder     Eczema Winter    Inflammatory bowel disease Maybe?    PONV (postoperative nausea and vomiting) 2015    Was given shot for headache while waiting fir colonoscopy, which worked but caused dry heaving    Rectal prolapse        Past Surgical History:   Procedure Laterality Date    BREAST BIOPSY      COLONOSCOPY      DENTAL SURGERY      DILATION AND CURETTAGE OF UTERUS      EYE SURGERY      LIPOMA RESECTION      Shoulder       Family History   Problem Relation Name Age of Onset    Stroke " Mother Dorinda     Osteoporosis Mother Dorinda     Dementia Mother Dorinda     Arthritis Mother Dorinda     Dementia Father      Depression Sister Zuleyka     Anxiety disorder Sister Zuleyka     Mental illness Sister Zuleyka         UNDETERMINED    Cancer Brother Bill     Dementia Brother      Dementia Brother      Fibrocystic breast disease Mother's Sister      Other (m aunt br ca hx per pt) Other       Social History     Tobacco Use   Smoking Status Former    Current packs/day: 0.00    Average packs/day: 0.3 packs/day for 20.0 years (6.0 ttl pk-yrs)    Types: Cigarettes    Start date:     Quit date:     Years since quittin.3   Smokeless Tobacco Never     Social History     Substance and Sexual Activity   Alcohol Use Yes    Alcohol/week: 2.0 standard drinks of alcohol    Types: 2 Glasses of wine per week    Comment: 2 drinks few times a week socially     Social History     Substance and Sexual Activity   Drug Use Yes    Frequency: 7.0 times per week    Types: Marijuana    Comment: honey with THC in tea     No Known Allergies    Current Outpatient Medications   Medication Instructions    ashwagandha extract 500 mg capsule As needed    BIOTIN ORAL 1 tablet, Daily PRN    calcium carbonate (Os-Jose A) 1,250 mg (500 mg elemental) tablet 1 tablet, 2 times daily (morning and late afternoon)    chlorhexidine (Peridex) 0.12 % solution SWISH AND SPIT 15ML FOR 30 SECONDS NIGHT PRIOR TO SURGERY AND MORNING OF SURGERY    docusate sodium (COLACE) 100 mg, 2 times daily PRN    estradiol (ESTRACE) 0.5 mg, oral, Daily    glucosamine/chondr hoffmann A sod (OSTEO BI-FLEX ORAL) Daily    L.acid/L.casei/B.bif/B.tigre/FOS (PROBIOTIC BLEND ORAL) As needed    multivitamin tablet 1 tablet, Daily PRN    NON FORMULARY As needed    NON FORMULARY 3 times weekly    NON FORMULARY 1 each, Daily    omega 3-dha-epa-fish oil 910-1,400 mg capsule Daily    progesterone (PROMETRIUM) 100 mg, oral, Nightly    psyllium (Metamucil) 3.4 gram packet 1 packet, Daily     "resveratroL 100 mg capsule Daily PRN    vit C/E/Zn/coppr/lutein/zeaxan (HEALTHY EYES LUTEIN-ZEAXANTHIN ORAL) Daily    VITAMIN K2 ORAL As needed       PAT ROS:   Constitutional:   neg    Neuro/Psych:   neg    Eyes:    use of corrective lenses  Ears:   neg    Nose:   neg    Mouth:   neg    Throat:   neg    Neck:   neg    Cardio:   neg    Respiratory:   neg    Endocrine:   neg    GI:    constipation  :   neg    Musculoskeletal:   neg    Hematologic:   neg    Skin:  neg        Physical Exam  Vitals reviewed. Physical exam within normal limits.        PAT AIRWAY:   Airway:     Mallampati::  II    TM distance::  >3 FB    Neck ROM::  Full  normal        Visit Vitals  /86   Pulse 78   Temp 36.1 °C (97 °F)   Resp 16   Ht 1.7 m (5' 6.93\")   Wt 58 kg (127 lb 13.9 oz)   SpO2 99%   BMI 20.07 kg/m²   OB Status Postmenopausal   Smoking Status Former   BSA 1.65 m²       Assessment and Plan:     Patient is a 69 year old female scheduled for robotic assisted rectopexy with Dr. Berrios on 5/27/25.    Plan      Cardiovascular:    RCRI: 1 Risk of Mace: 3.9%    Caprini:  5     Patient denies any chest pain, tightness, heaviness, pressure, radiating pain, palpitations, irregular heartbeats, lightheadedness, cough, congestion, shortness of breath, CABALLERO, PND, near syncope, weight loss or gain.    Good functional capacity  Functional 4 Mets. Patient denies SOB walking up 2 flights of stairs      EKG in PAT not indicated.      Pulmonary:    Stop Bang score is 1 placing patient at low risk for YUE  ARISCAT: 26-44 points, 13.3% risk of in-hospital postoperative pulmonary complication  PRODIGY: Moderate risk for opioid induced respiratory depression  Pumonary toilet education discussed, patient also provided deep breathing exercises and incentive spirometry educational handout      Renal/endo:  Recommendations to avoid nephrotoxic drugs and carefully monitor fluid status to maintain euvolemia. Use dose adjusted medications as needed for " the underlying level of renal function.      GI/:    Rectal prolapse- pending rectopexy    Heme:  Patient instructed to ambulate as soon as possible postoperatively to decrease thromboembolic risk.    Initiate mechanical DVT prophylaxis as soon as possible and initiate chemical prophylaxis when deemed safe from a bleeding standpoint post surgery.        Risk assessment complete.  This patient is LOW risk candidate undergoing MODERATE risk procedure, patient is medically optimized for surgery.      Labs/testing obtained in PAT on 5/19/25: CBC, BMP, T&S, MRSA    Lab Results   Component Value Date    WBC 4.2 (L) 05/19/2025    HGB 11.4 (L) 05/19/2025    HCT 33.8 (L) 05/19/2025    MCV 85 05/19/2025     05/19/2025     Lab Results   Component Value Date    GLUCOSE 89 05/19/2025    CALCIUM 9.6 05/19/2025     (L) 05/19/2025    K 4.6 05/19/2025    CO2 25 05/19/2025    CL 97 (L) 05/19/2025    BUN 17 05/19/2025    CREATININE 0.83 05/19/2025     ABO TYPE O   Rh TYPE NEG   ANTIBODY SCREEN NEG   Resulting Agency [facility]       Follow up/communication: none      Preoperative medication instructions were provided and reviewed with the patient.  Any additional testing or evaluation was explained to the patient.  Nothing by mouth instructions were discussed and patient's questions were answered prior to conclusion to this encounter.  Patient verbalized understanding of preoperative instructions given in preadmission testing; discharge instructions available in EMR.    This note was dictated with speech recognition.  Minor errors may have been detected during use of speech recognition.

## 2025-05-19 NOTE — CPM/PAT H&P
"[facility]/PAT Evaluation       Name: Margot Pineda (Margot Pineda \"Estela\")  /Age: 1955/69 y.o.     In-Person         Date of Consult: 25    Referring Provider:  Dr. Berrios    Date, Surgery, and Length:  25, robotic assisted rectopexy, 265 minutes      Patient presents to [facility] for perioperative risk assessment prior to scheduled surgery. Patient presents with rectal prolapse. She reports that for the last 4 to 5 years that she has felt a soft tissue mass that would protrude from her anus. She reports that sometimes she will feel this even when walking. She denies any blood per her anus.  However, she has now progressed to the point where she will need to push the mass back inside, and she is avoiding walking because of it.       This note was created in part upon personal review of patient's medical records.      Pt denies any past history of anesthetic complications such as PONV, awareness, prolonged sedation, dental damage, aspiration, cardiac arrest, difficult intubation, difficult I.V. access or unexpected hospital admissions. No history of malignant hyperthermia and or pseudocholinesterase deficiency.    No history of blood transfusions.    The patient IS NOT a [employer] and will accept blood and blood products if medically indicated.     Type and screen WAS sent.      Past Medical History:   Diagnosis Date    Anxiety ?    Chronic constipation     Depression ?    Eating disorder     Eczema Winter    Inflammatory bowel disease Maybe?    PONV (postoperative nausea and vomiting) 2015    Was given shot for headache while waiting fir colonoscopy, which worked but caused dry heaving    Rectal prolapse        Past Surgical History:   Procedure Laterality Date    BREAST BIOPSY      COLONOSCOPY      DENTAL SURGERY      DILATION AND CURETTAGE OF UTERUS      EYE SURGERY      LIPOMA RESECTION      Shoulder       Family History   Problem Relation Name Age of Onset    Stroke " Mother Dorinda     Osteoporosis Mother Dorinda     Dementia Mother Dorinda     Arthritis Mother Dorinda     Dementia Father      Depression Sister Zuleyka     Anxiety disorder Sister Zuleyka     Mental illness Sister Zuleyka         UNDETERMINED    Cancer Brother Bill     Dementia Brother      Dementia Brother      Fibrocystic breast disease Mother's Sister      Other (m aunt br ca hx per pt) Other       Social History     Tobacco Use   Smoking Status Former    Current packs/day: 0.00    Average packs/day: 0.3 packs/day for 20.0 years (6.0 ttl pk-yrs)    Types: Cigarettes    Start date:     Quit date:     Years since quittin.3   Smokeless Tobacco Never     Social History     Substance and Sexual Activity   Alcohol Use Yes    Alcohol/week: 2.0 standard drinks of alcohol    Types: 2 Glasses of wine per week    Comment: 2 drinks few times a week socially     Social History     Substance and Sexual Activity   Drug Use Yes    Frequency: 7.0 times per week    Types: Marijuana    Comment: honey with THC in tea     No Known Allergies    Current Outpatient Medications   Medication Instructions    ashwagandha extract 500 mg capsule As needed    BIOTIN ORAL 1 tablet, Daily PRN    calcium carbonate (Os-Jose A) 1,250 mg (500 mg elemental) tablet 1 tablet, 2 times daily (morning and late afternoon)    chlorhexidine (Peridex) 0.12 % solution SWISH AND SPIT 15ML FOR 30 SECONDS NIGHT PRIOR TO SURGERY AND MORNING OF SURGERY    docusate sodium (COLACE) 100 mg, 2 times daily PRN    estradiol (ESTRACE) 0.5 mg, oral, Daily    glucosamine/chondr hoffmann A sod (OSTEO BI-FLEX ORAL) Daily    L.acid/L.casei/B.bif/B.tigre/FOS (PROBIOTIC BLEND ORAL) As needed    multivitamin tablet 1 tablet, Daily PRN    NON FORMULARY As needed    NON FORMULARY 3 times weekly    NON FORMULARY 1 each, Daily    omega 3-dha-epa-fish oil 910-1,400 mg capsule Daily    progesterone (PROMETRIUM) 100 mg, oral, Nightly    psyllium (Metamucil) 3.4 gram packet 1 packet, Daily     "resveratroL 100 mg capsule Daily PRN    vit C/E/Zn/coppr/lutein/zeaxan (HEALTHY EYES LUTEIN-ZEAXANTHIN ORAL) Daily    VITAMIN K2 ORAL As needed       PAT ROS:   Constitutional:   neg    Neuro/Psych:   neg    Eyes:    use of corrective lenses  Ears:   neg    Nose:   neg    Mouth:   neg    Throat:   neg    Neck:   neg    Cardio:   neg    Respiratory:   neg    Endocrine:   neg    GI:    constipation  :   neg    Musculoskeletal:   neg    Hematologic:   neg    Skin:  neg        Physical Exam  Vitals reviewed. Physical exam within normal limits.        PAT AIRWAY:   Airway:     Mallampati::  II    TM distance::  >3 FB    Neck ROM::  Full  normal        Visit Vitals  /86   Pulse 78   Temp 36.1 °C (97 °F)   Resp 16   Ht 1.7 m (5' 6.93\")   Wt 58 kg (127 lb 13.9 oz)   SpO2 99%   BMI 20.07 kg/m²   OB Status Postmenopausal   Smoking Status Former   BSA 1.65 m²       Assessment and Plan:     Patient is a 69 year old female scheduled for robotic assisted rectopexy with Dr. Berrios on 5/27/25.    Plan      Cardiovascular:    RCRI: 1 Risk of Mace: 3.9%    Caprini:  5     Patient denies any chest pain, tightness, heaviness, pressure, radiating pain, palpitations, irregular heartbeats, lightheadedness, cough, congestion, shortness of breath, CABALLERO, PND, near syncope, weight loss or gain.    Good functional capacity  Functional 4 Mets. Patient denies SOB walking up 2 flights of stairs      EKG in PAT not indicated.      Pulmonary:    Stop Bang score is 1 placing patient at low risk for YUE  ARISCAT: 26-44 points, 13.3% risk of in-hospital postoperative pulmonary complication  PRODIGY: Moderate risk for opioid induced respiratory depression  Pumonary toilet education discussed, patient also provided deep breathing exercises and incentive spirometry educational handout      Renal/endo:  Recommendations to avoid nephrotoxic drugs and carefully monitor fluid status to maintain euvolemia. Use dose adjusted medications as needed for " the underlying level of renal function.      GI/:    Rectal prolapse- pending rectopexy    Heme:  Patient instructed to ambulate as soon as possible postoperatively to decrease thromboembolic risk.    Initiate mechanical DVT prophylaxis as soon as possible and initiate chemical prophylaxis when deemed safe from a bleeding standpoint post surgery.        Risk assessment complete.  This patient is LOW risk candidate undergoing MODERATE risk procedure, patient is medically optimized for surgery.      Labs/testing obtained in PAT on 5/19/25: CBC, BMP, T&S, MRSA    Lab Results   Component Value Date    WBC 4.2 (L) 05/19/2025    HGB 11.4 (L) 05/19/2025    HCT 33.8 (L) 05/19/2025    MCV 85 05/19/2025     05/19/2025     Lab Results   Component Value Date    GLUCOSE 89 05/19/2025    CALCIUM 9.6 05/19/2025     (L) 05/19/2025    K 4.6 05/19/2025    CO2 25 05/19/2025    CL 97 (L) 05/19/2025    BUN 17 05/19/2025    CREATININE 0.83 05/19/2025     ABO TYPE O   Rh TYPE NEG   ANTIBODY SCREEN NEG   Resulting Agency [facility]       Follow up/communication: none      Preoperative medication instructions were provided and reviewed with the patient.  Any additional testing or evaluation was explained to the patient.  Nothing by mouth instructions were discussed and patient's questions were answered prior to conclusion to this encounter.  Patient verbalized understanding of preoperative instructions given in preadmission testing; discharge instructions available in EMR.    This note was dictated with speech recognition.  Minor errors may have been detected during use of speech recognition.

## 2025-05-21 LAB — STAPHYLOCOCCUS SPEC CULT: NORMAL

## 2025-05-27 ENCOUNTER — ANESTHESIA (OUTPATIENT)
Dept: OPERATING ROOM | Facility: HOSPITAL | Age: 70
DRG: 331 | End: 2025-05-27
Payer: MEDICARE

## 2025-05-27 ENCOUNTER — ANESTHESIA EVENT (OUTPATIENT)
Dept: OPERATING ROOM | Facility: HOSPITAL | Age: 70
DRG: 331 | End: 2025-05-27
Payer: MEDICARE

## 2025-05-27 VITALS
HEART RATE: 77 BPM | RESPIRATION RATE: 16 BRPM | OXYGEN SATURATION: 100 % | SYSTOLIC BLOOD PRESSURE: 114 MMHG | TEMPERATURE: 96.8 F | DIASTOLIC BLOOD PRESSURE: 74 MMHG

## 2025-05-27 PROBLEM — Z12.11 SCREENING FOR COLON CANCER: Status: ACTIVE | Noted: 2025-05-27

## 2025-05-27 LAB
ABO GROUP (TYPE) IN BLOOD: NORMAL
RH FACTOR (ANTIGEN D): NORMAL

## 2025-05-27 PROCEDURE — 3600000018 HC OR TIME - INITIAL BASE CHARGE - PROCEDURE LEVEL SIX: Performed by: COLON & RECTAL SURGERY

## 2025-05-27 PROCEDURE — 3700000001 HC GENERAL ANESTHESIA TIME - INITIAL BASE CHARGE: Performed by: COLON & RECTAL SURGERY

## 2025-05-27 PROCEDURE — 7100000001 HC RECOVERY ROOM TIME - INITIAL BASE CHARGE: Performed by: COLON & RECTAL SURGERY

## 2025-05-27 PROCEDURE — A45400 PR LAP, SURG PROCTOPEXY: Performed by: ANESTHESIOLOGIST ASSISTANT

## 2025-05-27 PROCEDURE — 45400 LAPAROSCOPIC PROC: CPT | Performed by: COLON & RECTAL SURGERY

## 2025-05-27 PROCEDURE — 7100000010 HC PHASE TWO TIME - EACH INCREMENTAL 1 MINUTE: Performed by: COLON & RECTAL SURGERY

## 2025-05-27 PROCEDURE — 2500000004 HC RX 250 GENERAL PHARMACY W/ HCPCS (ALT 636 FOR OP/ED): Performed by: COLON & RECTAL SURGERY

## 2025-05-27 PROCEDURE — 7100000009 HC PHASE TWO TIME - INITIAL BASE CHARGE: Performed by: COLON & RECTAL SURGERY

## 2025-05-27 PROCEDURE — 3600000017 HC OR TIME - EACH INCREMENTAL 1 MINUTE - PROCEDURE LEVEL SIX: Performed by: COLON & RECTAL SURGERY

## 2025-05-27 PROCEDURE — 7100000002 HC RECOVERY ROOM TIME - EACH INCREMENTAL 1 MINUTE: Performed by: COLON & RECTAL SURGERY

## 2025-05-27 PROCEDURE — A45400 PR LAP, SURG PROCTOPEXY: Performed by: ANESTHESIOLOGY

## 2025-05-27 PROCEDURE — 2720000007 HC OR 272 NO HCPCS: Performed by: COLON & RECTAL SURGERY

## 2025-05-27 PROCEDURE — 2500000004 HC RX 250 GENERAL PHARMACY W/ HCPCS (ALT 636 FOR OP/ED): Mod: JZ | Performed by: ANESTHESIOLOGIST ASSISTANT

## 2025-05-27 PROCEDURE — 3700000002 HC GENERAL ANESTHESIA TIME - EACH INCREMENTAL 1 MINUTE: Performed by: COLON & RECTAL SURGERY

## 2025-05-27 PROCEDURE — 2500000004 HC RX 250 GENERAL PHARMACY W/ HCPCS (ALT 636 FOR OP/ED): Mod: JZ | Performed by: ANESTHESIOLOGY

## 2025-05-27 PROCEDURE — 2500000005 HC RX 250 GENERAL PHARMACY W/O HCPCS: Performed by: ANESTHESIOLOGY

## 2025-05-27 PROCEDURE — 1210000006 HC SEMI PRIVATE ROOM - IP ONLY PROCEDURE WITH INTENT

## 2025-05-27 PROCEDURE — 2500000001 HC RX 250 WO HCPCS SELF ADMINISTERED DRUGS (ALT 637 FOR MEDICARE OP): Performed by: ANESTHESIOLOGY

## 2025-05-27 PROCEDURE — 1210000001 HC SEMI-PRIVATE ROOM DAILY

## 2025-05-27 PROCEDURE — 36415 COLL VENOUS BLD VENIPUNCTURE: CPT | Performed by: COLON & RECTAL SURGERY

## 2025-05-27 PROCEDURE — 0DQP4ZZ REPAIR RECTUM, PERCUTANEOUS ENDOSCOPIC APPROACH: ICD-10-PCS | Performed by: COLON & RECTAL SURGERY

## 2025-05-27 PROCEDURE — 2500000004 HC RX 250 GENERAL PHARMACY W/ HCPCS (ALT 636 FOR OP/ED): Mod: JZ | Performed by: STUDENT IN AN ORGANIZED HEALTH CARE EDUCATION/TRAINING PROGRAM

## 2025-05-27 PROCEDURE — 8E0W4CZ ROBOTIC ASSISTED PROCEDURE OF TRUNK REGION, PERCUTANEOUS ENDOSCOPIC APPROACH: ICD-10-PCS | Performed by: COLON & RECTAL SURGERY

## 2025-05-27 PROCEDURE — 2500000005 HC RX 250 GENERAL PHARMACY W/O HCPCS: Performed by: COLON & RECTAL SURGERY

## 2025-05-27 RX ORDER — MIDAZOLAM HYDROCHLORIDE 1 MG/ML
INJECTION, SOLUTION INTRAMUSCULAR; INTRAVENOUS AS NEEDED
Status: DISCONTINUED | OUTPATIENT
Start: 2025-05-27 | End: 2025-05-27

## 2025-05-27 RX ORDER — ACETAMINOPHEN 325 MG/1
650 TABLET ORAL EVERY 4 HOURS PRN
Status: DISCONTINUED | OUTPATIENT
Start: 2025-05-27 | End: 2025-05-28 | Stop reason: HOSPADM

## 2025-05-27 RX ORDER — CEFAZOLIN SODIUM 1 G/50ML
1 SOLUTION INTRAVENOUS ONCE
Status: DISCONTINUED | OUTPATIENT
Start: 2025-05-27 | End: 2025-05-28 | Stop reason: HOSPADM

## 2025-05-27 RX ORDER — PHENYLEPHRINE 10 MG/250 ML(40 MCG/ML)IN 0.9 % SOD.CHLORIDE INTRAVENOUS
CONTINUOUS PRN
Status: DISCONTINUED | OUTPATIENT
Start: 2025-05-27 | End: 2025-05-27

## 2025-05-27 RX ORDER — ROCURONIUM BROMIDE 10 MG/ML
INJECTION, SOLUTION INTRAVENOUS AS NEEDED
Status: DISCONTINUED | OUTPATIENT
Start: 2025-05-27 | End: 2025-05-27

## 2025-05-27 RX ORDER — ACETAMINOPHEN 325 MG/1
650 TABLET ORAL EVERY 6 HOURS
Qty: 112 TABLET | Refills: 0 | Status: SHIPPED | OUTPATIENT
Start: 2025-05-27 | End: 2025-06-10

## 2025-05-27 RX ORDER — ONDANSETRON 4 MG/1
4 TABLET, FILM COATED ORAL EVERY 6 HOURS PRN
Qty: 20 TABLET | Refills: 0 | Status: SHIPPED | OUTPATIENT
Start: 2025-05-27 | End: 2025-06-03

## 2025-05-27 RX ORDER — SODIUM CHLORIDE, SODIUM LACTATE, POTASSIUM CHLORIDE, CALCIUM CHLORIDE 600; 310; 30; 20 MG/100ML; MG/100ML; MG/100ML; MG/100ML
100 INJECTION, SOLUTION INTRAVENOUS CONTINUOUS
Status: DISCONTINUED | OUTPATIENT
Start: 2025-05-27 | End: 2025-05-27 | Stop reason: HOSPADM

## 2025-05-27 RX ORDER — GLYCOPYRROLATE 0.2 MG/ML
INJECTION INTRAMUSCULAR; INTRAVENOUS AS NEEDED
Status: DISCONTINUED | OUTPATIENT
Start: 2025-05-27 | End: 2025-05-27

## 2025-05-27 RX ORDER — FENTANYL CITRATE 50 UG/ML
INJECTION, SOLUTION INTRAMUSCULAR; INTRAVENOUS AS NEEDED
Status: DISCONTINUED | OUTPATIENT
Start: 2025-05-27 | End: 2025-05-27

## 2025-05-27 RX ORDER — OXYCODONE HYDROCHLORIDE 5 MG/1
5 TABLET ORAL EVERY 4 HOURS PRN
Status: DISCONTINUED | OUTPATIENT
Start: 2025-05-27 | End: 2025-05-28 | Stop reason: HOSPADM

## 2025-05-27 RX ORDER — SODIUM CHLORIDE, SODIUM LACTATE, POTASSIUM CHLORIDE, CALCIUM CHLORIDE 600; 310; 30; 20 MG/100ML; MG/100ML; MG/100ML; MG/100ML
INJECTION, SOLUTION INTRAVENOUS CONTINUOUS PRN
Status: DISCONTINUED | OUTPATIENT
Start: 2025-05-27 | End: 2025-05-27

## 2025-05-27 RX ORDER — PROPOFOL 10 MG/ML
INJECTION, EMULSION INTRAVENOUS AS NEEDED
Status: DISCONTINUED | OUTPATIENT
Start: 2025-05-27 | End: 2025-05-27

## 2025-05-27 RX ORDER — IBUPROFEN 600 MG/1
300 TABLET, FILM COATED ORAL EVERY 6 HOURS
Qty: 28 TABLET | Refills: 0 | Status: SHIPPED | OUTPATIENT
Start: 2025-05-27 | End: 2025-06-10

## 2025-05-27 RX ORDER — LIDOCAINE HYDROCHLORIDE 20 MG/ML
INJECTION, SOLUTION INFILTRATION; PERINEURAL AS NEEDED
Status: DISCONTINUED | OUTPATIENT
Start: 2025-05-27 | End: 2025-05-27

## 2025-05-27 RX ORDER — ONDANSETRON HYDROCHLORIDE 2 MG/ML
INJECTION, SOLUTION INTRAVENOUS AS NEEDED
Status: DISCONTINUED | OUTPATIENT
Start: 2025-05-27 | End: 2025-05-27

## 2025-05-27 RX ORDER — HEPARIN SODIUM 5000 [USP'U]/ML
5000 INJECTION, SOLUTION INTRAVENOUS; SUBCUTANEOUS ONCE
Status: DISCONTINUED | OUTPATIENT
Start: 2025-05-27 | End: 2025-05-28 | Stop reason: HOSPADM

## 2025-05-27 RX ORDER — PHENYLEPHRINE HCL IN 0.9% NACL 1 MG/10 ML
SYRINGE (ML) INTRAVENOUS AS NEEDED
Status: DISCONTINUED | OUTPATIENT
Start: 2025-05-27 | End: 2025-05-27

## 2025-05-27 RX ORDER — WATER 1 ML/ML
INJECTION IRRIGATION AS NEEDED
Status: DISCONTINUED | OUTPATIENT
Start: 2025-05-27 | End: 2025-05-27 | Stop reason: HOSPADM

## 2025-05-27 RX ORDER — ASPIRIN 81 MG
100 TABLET, DELAYED RELEASE (ENTERIC COATED) ORAL 2 TIMES DAILY
Qty: 20 TABLET | Refills: 0 | Status: SHIPPED | OUTPATIENT
Start: 2025-05-27 | End: 2025-06-06

## 2025-05-27 RX ORDER — LIDOCAINE HYDROCHLORIDE 10 MG/ML
0.1 INJECTION, SOLUTION EPIDURAL; INFILTRATION; INTRACAUDAL; PERINEURAL ONCE
Status: DISCONTINUED | OUTPATIENT
Start: 2025-05-27 | End: 2025-05-28 | Stop reason: HOSPADM

## 2025-05-27 RX ORDER — OXYCODONE HYDROCHLORIDE 5 MG/1
5 TABLET ORAL EVERY 6 HOURS PRN
Qty: 12 TABLET | Refills: 0 | Status: SHIPPED | OUTPATIENT
Start: 2025-05-27 | End: 2025-06-03

## 2025-05-27 RX ORDER — METRONIDAZOLE 500 MG/100ML
500 INJECTION, SOLUTION INTRAVENOUS ONCE
Status: COMPLETED | OUTPATIENT
Start: 2025-05-27 | End: 2025-05-27

## 2025-05-27 RX ORDER — CEFAZOLIN 1 G/1
INJECTION, POWDER, FOR SOLUTION INTRAVENOUS AS NEEDED
Status: DISCONTINUED | OUTPATIENT
Start: 2025-05-27 | End: 2025-05-27

## 2025-05-27 RX ORDER — KETOROLAC TROMETHAMINE 30 MG/ML
INJECTION, SOLUTION INTRAMUSCULAR; INTRAVENOUS AS NEEDED
Status: DISCONTINUED | OUTPATIENT
Start: 2025-05-27 | End: 2025-05-27

## 2025-05-27 RX ADMIN — KETOROLAC TROMETHAMINE 30 MG: 30 INJECTION, SOLUTION INTRAMUSCULAR at 14:09

## 2025-05-27 RX ADMIN — HYDROMORPHONE HYDROCHLORIDE 0.5 MG: 1 INJECTION, SOLUTION INTRAMUSCULAR; INTRAVENOUS; SUBCUTANEOUS at 14:55

## 2025-05-27 RX ADMIN — ROCURONIUM BROMIDE 30 MG: 10 INJECTION INTRAVENOUS at 12:56

## 2025-05-27 RX ADMIN — METRONIDAZOLE 500 MG: 500 SOLUTION INTRAVENOUS at 12:33

## 2025-05-27 RX ADMIN — OXYCODONE HYDROCHLORIDE 5 MG: 5 TABLET ORAL at 15:06

## 2025-05-27 RX ADMIN — ROCURONIUM BROMIDE 20 MG: 10 INJECTION INTRAVENOUS at 13:39

## 2025-05-27 RX ADMIN — HYDROMORPHONE HYDROCHLORIDE 0.5 MG: 1 INJECTION, SOLUTION INTRAMUSCULAR; INTRAVENOUS; SUBCUTANEOUS at 14:45

## 2025-05-27 RX ADMIN — ONDANSETRON 4 MG: 2 INJECTION INTRAMUSCULAR; INTRAVENOUS at 14:03

## 2025-05-27 RX ADMIN — HYDROMORPHONE HYDROCHLORIDE 0.5 MG: 1 INJECTION, SOLUTION INTRAMUSCULAR; INTRAVENOUS; SUBCUTANEOUS at 15:03

## 2025-05-27 RX ADMIN — ROCURONIUM BROMIDE 70 MG: 10 INJECTION INTRAVENOUS at 12:26

## 2025-05-27 RX ADMIN — CEFAZOLIN 2 G: 330 INJECTION, POWDER, FOR SOLUTION INTRAMUSCULAR; INTRAVENOUS at 12:32

## 2025-05-27 RX ADMIN — Medication 100 MCG: at 12:40

## 2025-05-27 RX ADMIN — PROPOFOL 200 MG: 10 INJECTION, EMULSION INTRAVENOUS at 12:26

## 2025-05-27 RX ADMIN — SODIUM CHLORIDE, SODIUM LACTATE, POTASSIUM CHLORIDE, AND CALCIUM CHLORIDE: .6; .31; .03; .02 INJECTION, SOLUTION INTRAVENOUS at 12:13

## 2025-05-27 RX ADMIN — SODIUM CHLORIDE, SODIUM LACTATE, POTASSIUM CHLORIDE, AND CALCIUM CHLORIDE: .6; .31; .03; .02 INJECTION, SOLUTION INTRAVENOUS at 13:17

## 2025-05-27 RX ADMIN — GLYCOPYRROLATE 0.2 MG: 0.2 INJECTION INTRAMUSCULAR; INTRAVENOUS at 12:47

## 2025-05-27 RX ADMIN — Medication 300 MCG: at 14:09

## 2025-05-27 RX ADMIN — FENTANYL CITRATE 100 MCG: 50 INJECTION, SOLUTION INTRAMUSCULAR; INTRAVENOUS at 12:26

## 2025-05-27 RX ADMIN — PHENYLEPHRINE-NACL IV SOLUTION 10 MG/250ML-0.9% 0.4 MCG/KG/MIN: 10-0.9/25 SOLUTION at 13:38

## 2025-05-27 RX ADMIN — Medication 6 L/MIN: at 14:25

## 2025-05-27 RX ADMIN — Medication 100 MCG: at 13:54

## 2025-05-27 RX ADMIN — Medication 50 MCG: at 13:39

## 2025-05-27 RX ADMIN — MIDAZOLAM HYDROCHLORIDE 2 MG: 1 INJECTION, SOLUTION INTRAMUSCULAR; INTRAVENOUS at 12:11

## 2025-05-27 RX ADMIN — Medication 200 MCG: at 13:06

## 2025-05-27 RX ADMIN — LIDOCAINE HYDROCHLORIDE 100 MG: 20 INJECTION, SOLUTION INFILTRATION; PERINEURAL at 12:26

## 2025-05-27 SDOH — HEALTH STABILITY: MENTAL HEALTH: CURRENT SMOKER: 0

## 2025-05-27 ASSESSMENT — PAIN SCALES - GENERAL
PAINLEVEL_OUTOF10: 0 - NO PAIN
PAINLEVEL_OUTOF10: 7
PAINLEVEL_OUTOF10: 4
PAINLEVEL_OUTOF10: 4
PAINLEVEL_OUTOF10: 0 - NO PAIN
PAINLEVEL_OUTOF10: 6
PAINLEVEL_OUTOF10: 5 - MODERATE PAIN
PAINLEVEL_OUTOF10: 4
PAINLEVEL_OUTOF10: 7
PAINLEVEL_OUTOF10: 0 - NO PAIN
PAINLEVEL_OUTOF10: 4

## 2025-05-27 ASSESSMENT — COLUMBIA-SUICIDE SEVERITY RATING SCALE - C-SSRS
1. IN THE PAST MONTH, HAVE YOU WISHED YOU WERE DEAD OR WISHED YOU COULD GO TO SLEEP AND NOT WAKE UP?: NO
6. HAVE YOU EVER DONE ANYTHING, STARTED TO DO ANYTHING, OR PREPARED TO DO ANYTHING TO END YOUR LIFE?: NO
2. HAVE YOU ACTUALLY HAD ANY THOUGHTS OF KILLING YOURSELF?: NO

## 2025-05-27 ASSESSMENT — PAIN - FUNCTIONAL ASSESSMENT
PAIN_FUNCTIONAL_ASSESSMENT: 0-10
PAIN_FUNCTIONAL_ASSESSMENT: VAS (VISUAL ANALOG SCALE)
PAIN_FUNCTIONAL_ASSESSMENT: 0-10
PAIN_FUNCTIONAL_ASSESSMENT: VAS (VISUAL ANALOG SCALE)
PAIN_FUNCTIONAL_ASSESSMENT: 0-10

## 2025-05-27 ASSESSMENT — PAIN DESCRIPTION - LOCATION
LOCATION: ABDOMEN
LOCATION: ABDOMEN

## 2025-05-27 NOTE — ANESTHESIA PROCEDURE NOTES
Airway  Date/Time: 5/27/2025 12:30 PM  Reason: elective    Airway not difficult    Staffing  Performed: ANTOINE   Authorized by: Herb Gibson MD    Performed by: ANTOINE Dhaliwal  Patient location during procedure: OR    Patient Condition  Indications for airway management: anesthesia  Patient position: sniffing  MILS not maintained throughout  Planned trial extubation  Sedation level: deep     Final Airway Details   Preoxygenated: yes  Final airway type: endotracheal airway  Successful airway: ETT  Cuffed: yes   Successful intubation technique: direct laryngoscopy  Adjuncts used in placement: intubating stylet  Endotracheal tube insertion site: oral  Blade: Herber  Blade size: #3  ETT size (mm): 7.0  Cormack-Lehane Classification: grade I - full view of glottis  Placement verified by: chest auscultation and capnometry   Measured from: lips  ETT to lips (cm): 22  Number of attempts at approach: 1    Additional Comments  atraumatic

## 2025-05-27 NOTE — OP NOTE
"Robotic Assisted Rectopexy Operative Note     Date: 2025  OR Location: [facility] OR    Name: Margot Pineda \"Estela\", : 1955, Age: 69 y.o., MRN: [id number], Sex: female    Diagnosis  Pre-op Diagnosis      * Rectal prolapse [K62.3] Post-op Diagnosis     * Rectal prolapse [K62.3]     Procedures  Robotic Assisted Rectopexy  11942 - [place] LAPAROSCOPY PROCTOPEXY PROLAPSE      Surgeons      * Nikia Berrios - Primary    Resident/Fellow/Other Assistant:  Surgeons and Role:     * Nena Maharaj MD - Resident - Assisting    Staff:   Surgical Assistant:   Circulator: Janell Booth Person: Mary Hernandez Scrub: Sissy Hernandez Circulator: Nelli    Anesthesia Staff: Anesthesiologist: Herb Gibson MD  C-AA: ANTOINE Bay; ANTOINE Dhaliwal    Procedure Summary  Anesthesia: Anesthesia type not filed in the log.  ASA: ASA status not filed in the log.  Estimated Blood Loss: 25mL  Intra-op Medications:   Administrations occurring from 1230 to 1655 on 25:   Medication Name Total Dose   BUPivacaine HCl (Marcaine) 0.5 % (5 mg/mL) 30 mL in sodium chloride 0.9% 30 mL syringe 51 mL   sterile water irrigation solution 500 mL   ceFAZolin (Ancef) vial 1 g 2 g   glycopyrrolate (Robinul) injection 0.2 mg   ketorolac (Toradol) injection 30 mg 30 mg   LR infusion Cannot be calculated   ondansetron 2 mg/mL 4 mg   phenylephrine (Joel-Synephrine) 10 mg in sodium chloride 0.9% 250 mL (0.04 mg/mL) infusion (premix) 0.68 mg   phenylephrine 100 mcg/mL syringe 10 mL (prefilled) 750 mcg   rocuronium (ZeMuron) 50 mg/5 mL injection 50 mg   metroNIDAZOLE (Flagyl) 500 mg in sodium chloride (iso)  mL 500 mg              Anesthesia Record               Intraprocedure I/O Totals          Intake    Propofol Drip 0.00 mL    The total shown is the total volume documented since Anesthesia Start was filed.    Phenylephrine Drip 0.00 mL    The total shown is the total volume documented since Anesthesia Start was filed.    LR infusion " "1600.00 mL    metroNIDAZOLE (Flagyl) 500 mg in sodium chloride (iso)  mL 100.00 mL    Total Intake 1700 mL       Output    Est. Blood Loss 25 mL    Total Output 25 mL       Net    Net Volume 1675 mL          Specimen: No specimens collected              Drains and/or Catheters: * None in log *    Findings: significant rectal prolapse    Indications: Margot Pineda \"Estela\" is an 69 y.o. female who is having surgery for Rectal prolapse [K62.3].     The patient was seen in the preoperative area. The risks, benefits, complications, treatment options, non-operative alternatives, expected recovery and outcomes were discussed with the patient. The possibilities of reaction to medication, pulmonary aspiration, injury to surrounding structures, bleeding, recurrent infection, the need for additional procedures, failure to diagnose a condition, and creating a complication requiring transfusion or operation were discussed with the patient. The patient concurred with the proposed plan, giving informed consent.  The site of surgery was properly noted/marked if necessary per policy. The patient has been actively warmed in preoperative area. Preoperative antibiotics have been ordered and given within 1 hours of incision. Venous thrombosis prophylaxis have been ordered including bilateral sequential compression devices and chemical prophylaxis    Procedure Details: The patient was brought to the operating room and moved to the table in the lithotomy position.  General endotracheal anesthesia was induced IV antibiotics and a heparin shot were administered. The patient was prepped using ChloraPrep which was allowed to dry for 3 minutes and taped and draped in the usual sterile fashion.    An 8 mm robotic port site was created in the umbilicus the peritoneal cavity was entered using an Optiview without injury to the underlying bowel.  Pneumoperitoneum was established and two 8 mm ports were placed on the left and 1 on the right " under direct vision.  A 5 mm assistant port was placed in the right lateral position.  The patient was then placed in the Trendelenburg position and the robot was brought in and docked.  The right peritoneum of the mesorectum was scored using cut on the monopolar scissors.  The avascular plane between the posterior aspect of the mesorectum and the presacral tissues was dissected using the monopolar cautery all the way to the pelvic floor.  The right and left sides were joined and the anterior peritoneal reflection was scored.  The rectovaginal septum was dissected down to the anus.  The lateral stalks were maintained intact.    The sacral promontory was cleared and the left thickened peritoneum was sutured to the sacral promontory using two #1 Ethibond sutures.  The right side of the thickened anterior peritoneal reflection was sutured to the sacral promontory using two #1 Ethibond sutures as well.  There was no kinking of the rectum or the colon after this procedure.  The uterus was held up using a Reed needle stitch this was taken down and bleeding was stopped and the uterus and tubes and ovaries laid over the exposed mesorectum, therefore the peritoneum was not closed.     A four-quadrant tap block was performed instilling 10 cc of quarter percent Marcaine in the right upper quadrant of the right lower quadrant left upper quadrant left lower quadrant under direct vision in the transversus abdominus plane.  We watched the ports come out and the skin was closed using 4-0 Monocryl in an interrupted fashion and closed with LiquiBand.  Evidence of Infection: No   Complications:  None; patient tolerated the procedure well.    Disposition: PACU - hemodynamically stable.  Condition: stable       Attending Attestation: I was present and scrubbed for the entire procedure.    Nikia Berrios  Phone Number: [phone]

## 2025-05-27 NOTE — PERIOPERATIVE NURSING NOTE
1425 Assuming care of pt at this time. Bedside report received from outgoing RN. Pt arrived to [facility]  at this time, report received from OR and anesthesia staff. Phase 1 care started. Message sent to family via text at this time.   1430 see changes   1433 o2 removed, water given   1445 dilaudid 0.5 mg given, see changes   1455 dilaudid 0.5mg given   1500 see changes   1503 dilaudid 0.5mg given   1506 oxy 5mg given, called Dtr Zenobia to give update   1515 no changes   1530 no changes   1533 no changes, Pt meets discharge criteria from phase 1 at this time   1534 pt in phase 2   1535 Discharge instructions provided to pt and family. Educated on medications, effects of anesthesia, and homecoming care. Pt and family verbalizing understanding of all instructions provided at this time. All questions and concerns answered. Pt given contact information for provider.   1543 Pt assisted with dressing with help of family. IV removed dressing applied.   1553 Pt assisted to main lobby via  by transport. Dc in stable condition to home. All belongings taken with pt.

## 2025-05-27 NOTE — INTERVAL H&P NOTE
H&P reviewed. The patient was examined and there are no changes to the H&P.    Bowel prep completed. No new issues. No new medications or medical problems.

## 2025-05-27 NOTE — ANESTHESIA PREPROCEDURE EVALUATION
"Patient: Margot Pineda \"Estela\"    Procedure Information       Anesthesia Start Date/Time: 05/27/25 1213    Procedure: Robotic Assisted Rectopexy (Abdomen)    Location: [facility] OR 08 / Virtual [facility] OR    Surgeons: Nikia Berrios MD            Relevant Problems   Neuro   (+) Disorder of left cervical nerve root   (+) Left cervical radiculopathy   (+) Primary dysthymia      /Renal   (+) Hyponatremia       Clinical information reviewed:   Tobacco  Allergies  Meds   Med Hx  Surg Hx  OB Status  Fam Hx  Soc   Hx         Medical History[1]   Surgical History[2]  Social History[3]   Current Outpatient Medications   Medication Instructions    ashwagandha extract 500 mg capsule As needed    BIOTIN ORAL 1 tablet, Daily PRN    calcium carbonate (Os-Jose A) 1,250 mg (500 mg elemental) tablet 1 tablet, 2 times daily (morning and late afternoon)    chlorhexidine (Peridex) 0.12 % solution SWISH AND SPIT 15ML FOR 30 SECONDS NIGHT PRIOR TO SURGERY AND MORNING OF SURGERY    docusate sodium (COLACE) 100 mg, 2 times daily PRN    estradiol (ESTRACE) 0.5 mg, oral, Daily    glucosamine/chondr hoffmann A sod (OSTEO BI-FLEX ORAL) Daily    L.acid/L.casei/B.bif/B.tigre/FOS (PROBIOTIC BLEND ORAL) As needed    multivitamin tablet 1 tablet, Daily PRN    NON FORMULARY As needed    NON FORMULARY 3 times weekly    NON FORMULARY 1 each, Daily    omega 3-dha-epa-fish oil 910-1,400 mg capsule Daily    progesterone (PROMETRIUM) 100 mg, oral, Nightly    psyllium (Metamucil) 3.4 gram packet 1 packet, Daily    resveratroL 100 mg capsule Daily PRN    vit C/E/Zn/coppr/lutein/zeaxan (HEALTHY EYES LUTEIN-ZEAXANTHIN ORAL) Daily    VITAMIN K2 ORAL As needed      RX Allergies[4]     Chemistry    Lab Results   Component Value Date/Time     (L) 05/19/2025 1412    K 4.6 05/19/2025 1412    CL 97 (L) 05/19/2025 1412    CO2 25 05/19/2025 1412    BUN 17 05/19/2025 1412    CREATININE 0.83 05/19/2025 1412    Lab Results   Component Value Date/Time    CALCIUM 9.6 " "05/19/2025 1412    ALKPHOS 74 10/24/2024 1210    AST 30 10/24/2024 1210    ALT 23 10/24/2024 1210    BILITOT 0.6 10/24/2024 1210          No results found for: \"HGBA1C\"  Lab Results   Component Value Date/Time    WBC 4.2 (L) 05/19/2025 1412    HGB 11.4 (L) 05/19/2025 1412    HCT 33.8 (L) 05/19/2025 1412     05/19/2025 1412     No results found for: \"PROTIME\", \"PTT\", \"INR\"  No results found for: \"ABORH\"  No results found for this or any previous visit (from the past 4464 hours).  No results found for this or any previous visit from the past 1095 days.       Visit Vitals  OB Status Postmenopausal   Smoking Status Former     NPO/Void Status  Carbohydrate Drink Given Prior to Surgery? : N  Date of Last Liquid: 05/27/25  Time of Last Liquid: 0930  Date of Last Solid: 05/25/25  Time of Last Solid: 1200  Last Intake Type: Clear fluids  Time of Last Void: 1152        Physical Exam    Airway  Mallampati: II  TM distance: >3 FB  Neck ROM: full  Mouth opening: 3 or more finger widths     Cardiovascular - normal exam   Dental - normal exam     Pulmonary - normal exam   Abdominal - normal exam           Anesthesia Plan    History of general anesthesia?: yes  History of complications of general anesthesia?: no    ASA 2     general     The patient is not a current smoker.    intravenous induction   Postoperative pain plan includes opioids.  Anesthetic plan and risks discussed with patient.  Use of blood products discussed with patient who.    Plan discussed with attending and CAA.             [1]   Past Medical History:  Diagnosis Date    Anxiety 1963?    Chronic constipation     Depression ?    Eating disorder 1972    Eczema Winter    Inflammatory bowel disease Maybe?    PONV (postoperative nausea and vomiting) 4/2015    Was given shot for headache while waiting fir colonoscopy, which worked but caused dry heaving    Rectal prolapse    [2]   Past Surgical History:  Procedure Laterality Date    BREAST BIOPSY      " COLONOSCOPY      DENTAL SURGERY      DILATION AND CURETTAGE OF UTERUS      EYE SURGERY  2015    LIPOMA RESECTION      Shoulder   [3]   Social History  Tobacco Use    Smoking status: Former     Current packs/day: 0.00     Average packs/day: 0.3 packs/day for 20.0 years (6.0 ttl pk-yrs)     Types: Cigarettes     Start date:      Quit date:      Years since quittin.4    Smokeless tobacco: Never   Vaping Use    Vaping status: Never Used   Substance Use Topics    Alcohol use: Yes     Alcohol/week: 2.0 standard drinks of alcohol     Types: 2 Glasses of wine per week     Comment: 2 drinks few times a week socially    Drug use: Yes     Frequency: 7.0 times per week     Types: Marijuana     Comment: honey with THC in tea   [4] No Known Allergies

## 2025-05-27 NOTE — ANESTHESIA POSTPROCEDURE EVALUATION
"Patient: Margot Pineda \"Estela\"    Procedure Summary       Date: 05/27/25 [facility]    Anesthesia Start: 1213 Anesthesia Stop: 1428    Procedure: Robotic Assisted Rectopexy (Abdomen) Diagnosis:       Rectal prolapse      (Rectal prolapse [K62.3])    Surgeons: Nikia Berrios MD Responsible Provider: Herb Gibson MD    Anesthesia Type: general ASA Status: 2            Anesthesia Type: No value filed.    Vitals Value Taken Time   /84 05/27/25 14:47   Temp 36 °C (96.8 °F) 05/27/25 14:45   Pulse 73 05/27/25 14:57   Resp 17 05/27/25 14:45   SpO2 100 % 05/27/25 14:57   Vitals shown include unfiled device data.    Anesthesia Post Evaluation    Patient location during evaluation: PACU  Patient participation: complete - patient participated  Level of consciousness: awake  Pain management: adequate  Airway patency: patent  Cardiovascular status: acceptable  Respiratory status: acceptable  Hydration status: acceptable  Postoperative Nausea and Vomiting: none        No notable events documented.    "

## 2025-05-29 ENCOUNTER — TELEPHONE (OUTPATIENT)
Dept: SURGERY | Facility: CLINIC | Age: 70
End: 2025-05-29
Payer: MEDICARE

## 2025-05-29 NOTE — TELEPHONE ENCOUNTER
Attempted to reach patient to check on her after hospital discharge.  She is s/p  5/27/2025 Robotic Rectopexy.  Message left that I was calling to see how she was doing. I invited the patient to call the office with an update at [phone].  Shaylee Yañez RN

## 2025-06-02 ENCOUNTER — TELEPHONE (OUTPATIENT)
Dept: SURGERY | Facility: CLINIC | Age: 70
End: 2025-06-02
Payer: MEDICARE

## 2025-06-02 NOTE — TELEPHONE ENCOUNTER
Post op call. She is s/p 5/27/2025 Robotic Rectopexy for rectal prolapse.  She is calling today to report that she had a small bowel motion the day after surgery.  No bowel motion since this date.  She is passing gas.  She does not have severe abdominal pain.  Denies n/v.  Last dose of Oxycodone was 1/2 on Thursday of last week.  She has been taking Colace daily and Metamucil.  Last week she had pain in the chest that radiated to her shoulder. Pain more pronounced with a deep breath.  Patient advised that this the trapped gas from the laparoscopy. She was encouraged to do IS and lots of walking.   This pain has now resolved.    I advised 30 ml of Milk of Magnesia.  If no bowel motion by morning, can repeat.  Can also give herself a fleet enema.  Shaylee Yañez RN

## 2025-06-06 ENCOUNTER — TELEPHONE (OUTPATIENT)
Facility: CLINIC | Age: 70
End: 2025-06-06
Payer: MEDICARE

## 2025-06-06 ENCOUNTER — TELEPHONE (OUTPATIENT)
Dept: SURGERY | Facility: CLINIC | Age: 70
End: 2025-06-06
Payer: MEDICARE

## 2025-06-06 NOTE — TELEPHONE ENCOUNTER
Attempted to reach patient who is s/p 5/27/2025 Robotic Rectopexy.  She is c/o not moving her bowels since 5/28/2025.  She did take Milk of Magnesia earlier this week.  She also took a Fleet enema. After the enema she is having liquid stools run out of her that she cannot control.  She is able to tolerate diet.  She denies fever/chills.  Denies severe abdominal pain  I discussed symptoms with Dr. Berrios and then tried to call patient back.  Message left for patient that Dr. Berrios is advising that there is swelling at the rectum where the sutures were placed.  Dr. Berrios is advising to take oral laxative to help push out the stools.  Can take Milk of Magnesia.  Once you have a good bowel motion, I suggest staying on a gentle laxative like Miralax daily to help keep you moving until the post op swelling is resolved.  I invited the patient to call with questions at [phone].  Shaylee Yañez RN

## 2025-06-06 NOTE — TELEPHONE ENCOUNTER
Margot called the office and stated incontinence has developed since the enema she took earlier this week. Incontinence was present prior to surgery. I informed her the surgery only fixes the prolapse and not incontinence. She is eating and drinking normally. Denies n/v/f/c. After speaking with Dr. Berrios, I informed her that diarrhea is likely coming from constipation. She will need to continue Miralax and Milk of magnesia daily until diarrhea clears. It may take more time. She verbalized understanding. All questions answered.

## 2025-06-09 NOTE — PROGRESS NOTES
"Subjective   Patient ID: Margot Pineda \"Apurva" is a 69 y.o. female who presents for hormone replacement.    Pap: 7/25/22 NEG NEG; 12/12/2017 NEG HPV NEG  Mammo: 3/27/24  Dexa: 3/27/24  Colonoscopy: 5/8/25 good for 10 years.     Had rectopexy for rectal prolapse. Does pelvic floor exercises.     No VB. Had to change HRT because of insurance. Was off for a while and had hot flashes.  DIdn't like the patch. Had a bad reaction. Still has some hot flashes. Taking 1/2 of Estradiol tablet. Tolerating hot flashes.     Up to date with cholesterol, etc.     Takes vit D and calcium. Mother may have had osteoporosis.     degenerative disc disease in neck. Has steroids, Baclofen. Doing exercises. Just had an MRI. Degenerative disc disease.     * passed last year. Retired. Step daughter is in the area. She has a lot to do at the house.      Review of Systems   Gastrointestinal:  Positive for constipation.   Genitourinary:  Positive for frequency and urgency.   Neurological:  Positive for numbness.   Psychiatric/Behavioral:  Positive for sleep disturbance.         Anxiety   All other systems reviewed and are negative.      Objective   Constitutional: Alert and in no acute distress. Well developed, well nourished.  Neck: no neck asymmetry. Supple and thyroid not enlarged and there were no palpable thyroid nodules.  Chest: Breasts normal appearance, no nipple discharge and no skin changes and palpation of breasts and axillae: no palpable mass and no axillary lymphadenopathy.  Abdomen: soft nontender; no abdominal mass palpated, no organomegaly and no hernias.  Genitourinary: external genitalia: normal, no inguinal lymphadenopathy, Bartholin's urethral and [place]'s glands: normal, urethra: normal and bladder: normal on palpation.  Vagina: normal.  Cervix: normal.  Uterus: normal.   Right adnexa/parametria: normal.  Left adnexa/parametria: normal.  Skin: normal skin color and pigmentation, normal skin turgor and no " rash.  Psychiatric: alert and oriented x 3, affect normal to patient baseline and mood appropriate.     Assessment/Plan   -no pap  -kenny-chase  -HRT, discussed risks again. Will continue with Progesterone daily but take 1/2 of the Estradiol every other day.

## 2025-06-10 ENCOUNTER — APPOINTMENT (OUTPATIENT)
Dept: OBSTETRICS AND GYNECOLOGY | Facility: CLINIC | Age: 70
End: 2025-06-10
Payer: MEDICARE

## 2025-06-10 VITALS — DIASTOLIC BLOOD PRESSURE: 82 MMHG | SYSTOLIC BLOOD PRESSURE: 126 MMHG | BODY MASS INDEX: 19.78 KG/M2 | WEIGHT: 126 LBS

## 2025-06-10 DIAGNOSIS — Z79.890 HORMONE REPLACEMENT THERAPY: ICD-10-CM

## 2025-06-10 DIAGNOSIS — Z12.31 ENCOUNTER FOR SCREENING MAMMOGRAM FOR BREAST CANCER: Primary | ICD-10-CM

## 2025-06-10 PROCEDURE — 99213 OFFICE O/P EST LOW 20 MIN: CPT | Performed by: OBSTETRICS & GYNECOLOGY

## 2025-06-10 PROCEDURE — 1160F RVW MEDS BY RX/DR IN RCRD: CPT | Performed by: OBSTETRICS & GYNECOLOGY

## 2025-06-10 PROCEDURE — 1111F DSCHRG MED/CURRENT MED MERGE: CPT | Performed by: OBSTETRICS & GYNECOLOGY

## 2025-06-10 PROCEDURE — 1159F MED LIST DOCD IN RCRD: CPT | Performed by: OBSTETRICS & GYNECOLOGY

## 2025-06-10 PROCEDURE — 1036F TOBACCO NON-USER: CPT | Performed by: OBSTETRICS & GYNECOLOGY

## 2025-06-10 PROCEDURE — 1157F ADVNC CARE PLAN IN RCRD: CPT | Performed by: OBSTETRICS & GYNECOLOGY

## 2025-06-10 RX ORDER — PROGESTERONE 100 MG/1
100 CAPSULE ORAL NIGHTLY
Qty: 90 CAPSULE | Refills: 3 | Status: SHIPPED | OUTPATIENT
Start: 2025-06-10

## 2025-06-10 RX ORDER — ESTRADIOL 0.5 MG/1
0.5 TABLET ORAL DAILY
Qty: 90 TABLET | Refills: 1 | Status: SHIPPED | OUTPATIENT
Start: 2025-06-10

## 2025-06-10 ASSESSMENT — ENCOUNTER SYMPTOMS
CONSTIPATION: 1
SLEEP DISTURBANCE: 1
FREQUENCY: 1
NUMBNESS: 1

## 2025-06-17 ENCOUNTER — HOSPITAL ENCOUNTER (OUTPATIENT)
Dept: RADIOLOGY | Facility: HOSPITAL | Age: 70
Discharge: HOME | End: 2025-06-17
Payer: MEDICARE

## 2025-06-17 VITALS — HEIGHT: 66 IN | WEIGHT: 126 LBS | BODY MASS INDEX: 20.25 KG/M2

## 2025-06-17 DIAGNOSIS — Z12.31 ENCOUNTER FOR SCREENING MAMMOGRAM FOR BREAST CANCER: ICD-10-CM

## 2025-06-17 PROCEDURE — 77067 SCR MAMMO BI INCL CAD: CPT

## 2025-06-17 PROCEDURE — 77063 BREAST TOMOSYNTHESIS BI: CPT | Performed by: RADIOLOGY

## 2025-06-17 PROCEDURE — 77067 SCR MAMMO BI INCL CAD: CPT | Performed by: RADIOLOGY

## 2025-06-23 ENCOUNTER — OFFICE VISIT (OUTPATIENT)
Dept: SURGERY | Facility: CLINIC | Age: 70
End: 2025-06-23
Payer: MEDICARE

## 2025-06-23 VITALS
SYSTOLIC BLOOD PRESSURE: 124 MMHG | WEIGHT: 120 LBS | TEMPERATURE: 97.9 F | BODY MASS INDEX: 19.29 KG/M2 | DIASTOLIC BLOOD PRESSURE: 85 MMHG | HEIGHT: 66 IN | HEART RATE: 84 BPM

## 2025-06-23 DIAGNOSIS — K62.3 RECTAL PROLAPSE: Primary | ICD-10-CM

## 2025-06-23 PROCEDURE — 1111F DSCHRG MED/CURRENT MED MERGE: CPT | Performed by: NURSE PRACTITIONER

## 2025-06-23 PROCEDURE — 1159F MED LIST DOCD IN RCRD: CPT | Performed by: NURSE PRACTITIONER

## 2025-06-23 PROCEDURE — 3008F BODY MASS INDEX DOCD: CPT | Performed by: NURSE PRACTITIONER

## 2025-06-23 PROCEDURE — 99211 OFF/OP EST MAY X REQ PHY/QHP: CPT | Performed by: NURSE PRACTITIONER

## 2025-06-23 NOTE — PROGRESS NOTES
"History Of Present Illness  Margot Pineda \"Estela\" is a 68 yo female who is s/p robotic assisted rectopexy on 5/27/25 by Dr. Berrios for a rectal prolapse.    No c/o any soreness to the anus or discomfort.  She does not have any more prolapsing rectal tissue after her BM's.  She takes 1/2 dose of Miralax and Colace daily to keep her stools.  She will have 1-2 soft BM's every day with rare straining.  She is not sitting long on the toilet.  No c/o any rectal bleeding.      She is still having a little fecal incontinence.  She has been doing the pelvic floor exercises daily to help strengthen her pelvic floor and anus.       Past Medical History  She has a past medical history of Anxiety (1963?), Arthritis (2012?), Chronic constipation, Depression (?), Eating disorder (1972), Eczema (Winter), Inflammatory bowel disease (Maybe?), PONV (postoperative nausea and vomiting) (4/2015), Rectal bleeding (Rarely), Rectal prolapse, Urinary tract infection (1 time), and Varicella (1963).    Surgical History  She has a past surgical history that includes Breast biopsy; Eye surgery (2015); Lipoma resection; Dilation and curettage of uterus; Dental surgery; and Colonoscopy.     Social History  She reports that she quit smoking about 26 years ago. Her smoking use included cigarettes. She started smoking about 46 years ago. She has a 6 pack-year smoking history. She has never used smokeless tobacco. She reports current alcohol use of about 5.0 standard drinks of alcohol per week. She reports current drug use. Frequency: 7.00 times per week. Drug: Other.    Family History  Family History[1]     Allergies  Patient has no known allergies.    Review of Systems   All other systems reviewed and are negative.       Physical Exam  Vitals reviewed. Exam conducted with a chaperone present.   HENT:      Head: Normocephalic.   Cardiovascular:      Rate and Rhythm: Normal rate and regular rhythm.   Pulmonary:      Effort: Pulmonary effort is " normal.      Breath sounds: Normal breath sounds.   Abdominal:      General: Abdomen is flat. Bowel sounds are normal.      Palpations: Abdomen is soft.   Genitourinary:     Rectum: Normal.      Comments: No rectal prolapse or any prolapsing tissue.  Musculoskeletal:         General: Normal range of motion.   Skin:     General: Skin is warm and dry.   Neurological:      General: No focal deficit present.   Psychiatric:         Mood and Affect: Mood normal.         Behavior: Behavior normal.         Thought Content: Thought content normal.         Judgment: Judgment normal.          Assessment/Plan   Estela has been doing well since her robotic rectopexy.  She will continue to not lift over 10 pounds for another 2 weeks.  She will increase her Miralax to a full dose daily so that she will not strain with her BM's.  She will continue to do her pelvic floor exercise to strengthen the anus.  She will call with any issues and will follow up on an as needed basis.         Caterina Hurtado, APRN-CNP       [1]   Family History  Problem Relation Name Age of Onset    Stroke Mother Dorinda     Osteoporosis Mother Dorinda     Dementia Mother Dorinda     Arthritis Mother Dorinda     Dementia Father      Depression Sister Zuleyka     Anxiety disorder Sister Zuleyka     Mental illness Sister Zuleyka         UNDETERMINED    Cancer Brother Bill     Dementia Brother      Dementia Brother      Fibrocystic breast disease Mother's Sister      Other (m aunt br ca hx per pt) Other

## (undated) DEVICE — SUTURE, MONOCRYL, 4-0, 18 IN, PS2, UNDYED

## (undated) DEVICE — Device

## (undated) DEVICE — TOWEL, SURGICAL, NEURO, O/R, 16 X 26, BLUE, STERILE

## (undated) DEVICE — SUTURE, ETHIBOND EXCEL 1, TAPER POINT CT-1 GREEN 30 INCH

## (undated) DEVICE — CLEAN KIT, ANTIFOG SCOPE, SEE SHARP 150MM

## (undated) DEVICE — SYSTEM, TROCAR LAP, 5X100MM, SHIELD BLADED, KII ADVANCED FIX ABDOMINAL

## (undated) DEVICE — SYRINGE, 10 CC, LUER LOCK

## (undated) DEVICE — SCISSORS, MONOPOLAR, CURVED, 8MM

## (undated) DEVICE — OBTURATOR, BLADELESS , SU

## (undated) DEVICE — DRIVER, MEGA NEEDLE

## (undated) DEVICE — FORCEPS, CADIERE, DAVINCI XI

## (undated) DEVICE — COVER, TIP HOT SHEARS ENDOWRIST

## (undated) DEVICE — GLOVE, SURGICAL, PROTEXIS PI BLUE W/NEUTHERA, 6.5, PF, LF

## (undated) DEVICE — DRAPE, LEGGINGS, 28.5 X 43 IN, DISPOSABLE, LF, STERILE

## (undated) DEVICE — TUBE SET, PNEUMOCLEAR, SMOKE EVACU, HIGH-FLOW

## (undated) DEVICE — GLOVE, SURGICAL, PROTEXIS PI MICRO, 6.5, PF, LF

## (undated) DEVICE — GOWN, SURGICAL, SMARTGOWN, XLARGE, STERILE

## (undated) DEVICE — SHEAR, W/UNIPOLAR CAUTERY, ENDOSHEAR, 5 MM

## (undated) DEVICE — DRAPE, SHEET, UTILITY, NON ABSORBENT, 18 X 26 IN, LF

## (undated) DEVICE — SUTURE, VICRYL PLUS 3-0, SH, 27IN

## (undated) DEVICE — FORCEPS, BIPOLAR FENESTRATED XI

## (undated) DEVICE — ADHESIVE, SKIN, DERMABOND ADVANCED, 15CM, PEN-STYLE

## (undated) DEVICE — LUBRICANT, ELECTROLUBE, F/ELECTRODE TIPS

## (undated) DEVICE — SEAL, UNIVERSAL, 5-12MM

## (undated) DEVICE — DRAPE, ARM XI

## (undated) DEVICE — NERVE BLOCK, STIMUQUIK, SINGLE-SHOT, 21GA X 3-1/2

## (undated) DEVICE — DRAPE, SHEET, THREE QUARTER, FAN FOLD, 57 X 77 IN

## (undated) DEVICE — DRAPE, COLUMN, DAVINCI XI

## (undated) DEVICE — SPONGE, LAP, XRAY DECT, 18IN X 18IN, W/MASTER DMT, STERILE